# Patient Record
Sex: MALE | Race: WHITE
[De-identification: names, ages, dates, MRNs, and addresses within clinical notes are randomized per-mention and may not be internally consistent; named-entity substitution may affect disease eponyms.]

---

## 2017-02-02 ENCOUNTER — HOSPITAL ENCOUNTER (INPATIENT)
Age: 67
LOS: 4 days | Discharge: HOME | DRG: 190 | End: 2017-02-06
Payer: MEDICARE

## 2017-02-02 DIAGNOSIS — I10: ICD-10-CM

## 2017-02-02 DIAGNOSIS — Z87.891: ICD-10-CM

## 2017-02-02 DIAGNOSIS — Z79.52: ICD-10-CM

## 2017-02-02 DIAGNOSIS — E11.9: ICD-10-CM

## 2017-02-02 DIAGNOSIS — J84.10: ICD-10-CM

## 2017-02-02 DIAGNOSIS — J18.9: ICD-10-CM

## 2017-02-02 DIAGNOSIS — Z80.1: ICD-10-CM

## 2017-02-02 DIAGNOSIS — J44.0: Primary | ICD-10-CM

## 2017-02-02 DIAGNOSIS — J44.1: ICD-10-CM

## 2017-02-02 DIAGNOSIS — J96.01: ICD-10-CM

## 2017-02-02 DIAGNOSIS — Z79.84: ICD-10-CM

## 2017-02-02 DIAGNOSIS — J20.9: ICD-10-CM

## 2017-02-02 DIAGNOSIS — J44.9: ICD-10-CM

## 2017-02-02 DIAGNOSIS — J15.7: ICD-10-CM

## 2019-12-30 ENCOUNTER — HOSPITAL ENCOUNTER (INPATIENT)
Dept: HOSPITAL 76 - ED | Age: 69
LOS: 3 days | Discharge: HOME | DRG: 191 | End: 2020-01-02
Attending: NURSE PRACTITIONER | Admitting: INTERNAL MEDICINE
Payer: MEDICARE

## 2019-12-30 DIAGNOSIS — I25.2: ICD-10-CM

## 2019-12-30 DIAGNOSIS — G89.29: ICD-10-CM

## 2019-12-30 DIAGNOSIS — Z87.01: ICD-10-CM

## 2019-12-30 DIAGNOSIS — Z87.891: ICD-10-CM

## 2019-12-30 DIAGNOSIS — R60.0: ICD-10-CM

## 2019-12-30 DIAGNOSIS — Z79.51: ICD-10-CM

## 2019-12-30 DIAGNOSIS — M19.90: ICD-10-CM

## 2019-12-30 DIAGNOSIS — E87.5: ICD-10-CM

## 2019-12-30 DIAGNOSIS — M54.9: ICD-10-CM

## 2019-12-30 DIAGNOSIS — Z87.820: ICD-10-CM

## 2019-12-30 DIAGNOSIS — Z79.52: ICD-10-CM

## 2019-12-30 DIAGNOSIS — F10.11: ICD-10-CM

## 2019-12-30 DIAGNOSIS — T38.0X5A: ICD-10-CM

## 2019-12-30 DIAGNOSIS — H54.7: ICD-10-CM

## 2019-12-30 DIAGNOSIS — E11.65: ICD-10-CM

## 2019-12-30 DIAGNOSIS — R91.1: ICD-10-CM

## 2019-12-30 DIAGNOSIS — R35.0: ICD-10-CM

## 2019-12-30 DIAGNOSIS — N40.1: ICD-10-CM

## 2019-12-30 DIAGNOSIS — F41.9: ICD-10-CM

## 2019-12-30 DIAGNOSIS — R39.15: ICD-10-CM

## 2019-12-30 DIAGNOSIS — J44.1: Primary | ICD-10-CM

## 2019-12-30 DIAGNOSIS — K21.9: ICD-10-CM

## 2019-12-30 DIAGNOSIS — Z66: ICD-10-CM

## 2019-12-30 DIAGNOSIS — J96.11: ICD-10-CM

## 2019-12-30 DIAGNOSIS — Z90.2: ICD-10-CM
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
## 2019-12-30 DIAGNOSIS — Z51.5: ICD-10-CM

## 2019-12-30 DIAGNOSIS — E87.1: ICD-10-CM

## 2019-12-30 DIAGNOSIS — Z99.81: ICD-10-CM

## 2019-12-30 DIAGNOSIS — Z79.84: ICD-10-CM

## 2019-12-30 DIAGNOSIS — Z79.2: ICD-10-CM

## 2019-12-30 LAB
ALBUMIN DIAFP-MCNC: 4.1 G/DL (ref 3.2–5.5)
ALBUMIN/GLOB SERPL: 1.2 {RATIO} (ref 1–2.2)
ALP SERPL-CCNC: 64 IU/L (ref 42–121)
ALT SERPL W P-5'-P-CCNC: 21 IU/L (ref 10–60)
ANION GAP SERPL CALCULATED.4IONS-SCNC: 10 MMOL/L (ref 6–13)
AST SERPL W P-5'-P-CCNC: 22 IU/L (ref 10–42)
BASOPHILS NFR BLD AUTO: 0 10^3/UL (ref 0–0.1)
BASOPHILS NFR BLD AUTO: 0.3 %
BILIRUB BLD-MCNC: 0.4 MG/DL (ref 0.2–1)
BUN SERPL-MCNC: 21 MG/DL (ref 6–20)
CALCIUM UR-MCNC: 9.4 MG/DL (ref 8.5–10.3)
CHLORIDE SERPL-SCNC: 98 MMOL/L (ref 101–111)
CO2 SERPL-SCNC: 28 MMOL/L (ref 21–32)
CREAT SERPLBLD-SCNC: 1.1 MG/DL (ref 0.6–1.2)
EOSINOPHIL # BLD AUTO: 0 10^3/UL (ref 0–0.7)
EOSINOPHIL NFR BLD AUTO: 0 %
ERYTHROCYTE [DISTWIDTH] IN BLOOD BY AUTOMATED COUNT: 15.4 % (ref 12–15)
GFRSERPLBLD MDRD-ARVRAT: 66 ML/MIN/{1.73_M2} (ref 89–?)
GLOBULIN SER-MCNC: 3.5 G/DL (ref 2.1–4.2)
GLUCOSE SERPL-MCNC: 191 MG/DL (ref 70–100)
HGB UR QL STRIP: 12.1 G/DL (ref 14–18)
LIPASE SERPL-CCNC: 26 U/L (ref 22–51)
LYMPHOCYTES # SPEC AUTO: 0.5 10^3/UL (ref 1.5–3.5)
LYMPHOCYTES NFR BLD AUTO: 4.2 %
MCH RBC QN AUTO: 28.4 PG (ref 27–31)
MCHC RBC AUTO-ENTMCNC: 30.9 G/DL (ref 32–36)
MCV RBC AUTO: 92 FL (ref 80–94)
MONOCYTES # BLD AUTO: 0.5 10^3/UL (ref 0–1)
MONOCYTES NFR BLD AUTO: 5 %
NEUTROPHILS # BLD AUTO: 9.7 10^3/UL (ref 1.5–6.6)
NEUTROPHILS # SNV AUTO: 10.8 X10^3/UL (ref 4.8–10.8)
NEUTROPHILS NFR BLD AUTO: 89.8 %
PDW BLD AUTO: 8.3 FL (ref 7.4–11.4)
PLATELET # BLD: 341 10^3/UL (ref 130–450)
PROT SPEC-MCNC: 7.6 G/DL (ref 6.7–8.2)
RBC MAR: 4.26 10^6/UL (ref 4.7–6.1)
SODIUM SERPLBLD-SCNC: 136 MMOL/L (ref 135–145)

## 2019-12-30 PROCEDURE — 96376 TX/PRO/DX INJ SAME DRUG ADON: CPT

## 2019-12-30 PROCEDURE — 99285 EMERGENCY DEPT VISIT HI MDM: CPT

## 2019-12-30 PROCEDURE — 94640 AIRWAY INHALATION TREATMENT: CPT

## 2019-12-30 PROCEDURE — 96361 HYDRATE IV INFUSION ADD-ON: CPT

## 2019-12-30 PROCEDURE — 71045 X-RAY EXAM CHEST 1 VIEW: CPT

## 2019-12-30 PROCEDURE — 84484 ASSAY OF TROPONIN QUANT: CPT

## 2019-12-30 PROCEDURE — 87275 INFLUENZA B AG IF: CPT

## 2019-12-30 PROCEDURE — 84132 ASSAY OF SERUM POTASSIUM: CPT

## 2019-12-30 PROCEDURE — 93005 ELECTROCARDIOGRAM TRACING: CPT

## 2019-12-30 PROCEDURE — 83735 ASSAY OF MAGNESIUM: CPT

## 2019-12-30 PROCEDURE — 87276 INFLUENZA A AG IF: CPT

## 2019-12-30 PROCEDURE — 80053 COMPREHEN METABOLIC PANEL: CPT

## 2019-12-30 PROCEDURE — 83880 ASSAY OF NATRIURETIC PEPTIDE: CPT

## 2019-12-30 PROCEDURE — 85025 COMPLETE CBC W/AUTO DIFF WBC: CPT

## 2019-12-30 PROCEDURE — 84100 ASSAY OF PHOSPHORUS: CPT

## 2019-12-30 PROCEDURE — 83690 ASSAY OF LIPASE: CPT

## 2019-12-30 PROCEDURE — 80048 BASIC METABOLIC PNL TOTAL CA: CPT

## 2019-12-30 PROCEDURE — 83036 HEMOGLOBIN GLYCOSYLATED A1C: CPT

## 2019-12-30 PROCEDURE — 99223 1ST HOSP IP/OBS HIGH 75: CPT

## 2019-12-30 PROCEDURE — 96374 THER/PROPH/DIAG INJ IV PUSH: CPT

## 2019-12-30 PROCEDURE — 36415 COLL VENOUS BLD VENIPUNCTURE: CPT

## 2019-12-30 PROCEDURE — 96372 THER/PROPH/DIAG INJ SC/IM: CPT

## 2019-12-30 RX ADMIN — INSULIN ASPART SCH UNIT: 100 INJECTION, SOLUTION INTRAVENOUS; SUBCUTANEOUS at 21:17

## 2019-12-30 RX ADMIN — IPRATROPIUM BROMIDE AND ALBUTEROL SULFATE SCH ML: 2.5; .5 SOLUTION RESPIRATORY (INHALATION) at 23:21

## 2019-12-30 RX ADMIN — INSULIN GLARGINE SCH UNIT: 100 INJECTION, SOLUTION SUBCUTANEOUS at 21:16

## 2019-12-30 RX ADMIN — ACETAMINOPHEN AND CODEINE PHOSPHATE PRN TAB: 300; 30 TABLET ORAL at 22:09

## 2019-12-30 NOTE — ED PHYSICIAN DOCUMENTATION
PD HPI DYSPNEA





- Stated complaint


Stated Complaint: SOA





- Chief complaint


Chief Complaint: Resp





- History obtained from


History obtained from: Patient





- History of Present Illness


Timing - details: Gradual onset


Pain level max: 0


Pain level now: 0


Inciting event(s): URI


Improved by: O2, Rest


Worsened by: Exertion


Associated symptoms: Fever, Cough


Recently seen: Not recently seen





- Additional information


Additional information: 





69-year-old male with a history of COPD, sees a pulmonologist in [place].  He 

states he has been sick for the past 4 to 5 days.  Is maintained on azithromycin

daily.  Started Bactrim 4 days ago.  Concerned that he may have pneumonia.  He 

is normally on 2 L of home O2 24/7.  He has had to increase his home O2 to 3 L. 

He states he feels more out of breath.  Has had chills, unsure if he has had 

fever.  Has a dry cough.  He is currently on prednisone 40 mg a day as well.





Review of Systems


Ten Systems: 10 systems reviewed and negative


Constitutional: reports: Chills


Respiratory: reports: Cough


GI: denies: Vomiting


Skin: denies: Rash


Musculoskeletal: denies: Neck pain, Back pain


Neurologic: denies: Focal weakness, Numbness, Confused





PD PAST MEDICAL HISTORY





- Past Medical History


Past Medical History: Yes


Cardiovascular: MI


Respiratory: COPD


Endocrine/Autoimmune: Type 2 diabetes


GI: GERD


GYN: None


: None


HEENT: None


Psych: None


Musculoskeletal: None


Derm: None





- Past Surgical History


Past Surgical History: Yes


General: Other (thoracotomy of left lobe with wedge)





- Present Medications


Home Medications: 


                                Ambulatory Orders











 Medication  Instructions  Recorded  Confirmed


 


Acetaminophen/Cod 300/30 [Tylenol 1 - 2 tab PO Q6H PRN 02/02/17 02/02/17





#3]   


 


Albuterol Sulfate [Proair Hfa 2 puffs INH Q4H PRN 02/02/17 02/02/17





Inhaler]   


 


Diazepam 5 mg PO QPM 02/02/17 02/02/17


 


Etodolac [Lodine] 400 mg PO DAILY 02/02/17 02/02/17


 


Fluticasone 220 Mcg [Flovent] 2 puffs INH BID 02/02/17 02/02/17


 


Ipratropium/Albuterol [Combivent 1 puffs INH QID 02/02/17 02/02/17





Respimat]   


 


Salmeterol Xinafoate [Serevent 1 puffs INH BID 02/02/17 02/02/17





Diskus]   


 


Tiotropium Bromide [Spiriva] 1 puffs INH DAILY 02/02/17 02/02/17


 


lisinopriL [Lisinopril] 10 mg PO DAILY 02/02/17 02/02/17


 


metFORMIN [Glucophage] 1,000 mg PO DAILY PM 02/02/17 02/02/17


 


metFORMIN [Glucophage] 1,500 mg PO QDBREAKFAST 02/02/17 02/02/17


 


Benzonatate [Tessalon] 100 mg PO TID #30 capsule 02/06/17 


 


Budesonide [Pulmicort] 0.5 mg INH RTBID  neb 02/06/17 


 


Famotidine [Pepcid] 20 mg PO DAILY  tablet 02/06/17 


 


Formoterol Fumarate [Perforomist] 20 mcg INH RTBID  neb 02/06/17 


 


Insulin Aspart [NovoLOG] 1 - 5 unit SUBQ 02/06/17 





 0800,1200,1700,2100  pen  


 


diazePAM [Valium] 5 mg PO QPM #30 tablet 02/06/17 


 


guaiFENesin/CODEINE [Robitussin AC] 5 ml PO Q6HR PRN 7 Days  udc 02/06/17 


 


levoFLOXacin [Levaquin] 750 mg PO DAILY #5 tablet 02/06/17 


 


predniSONE [Deltasone] 20 mg PO DAILYWM #10 tablet 02/06/17 














- Allergies


Allergies/Adverse Reactions: 


                                    Allergies











Allergy/AdvReac Type Severity Reaction Status Date / Time


 


ciprofloxacin [From Cipro] Allergy  Unknown Verified 12/30/19 14:21


 


ciprofloxacin HCl * Allergy  Unknown Verified 12/30/19 14:21





[From Cipro]     


 


hydromorphone HCl * Allergy  Unknown Verified 12/30/19 14:21





[From Dilaudid]     


 


oxycodone Allergy  Unknown Verified 12/30/19 14:21


 


oxycodone HCl * Allergy  Unknown Verified 12/30/19 14:21





[From OxyContin]     














- Social History


Does the pt smoke?: No


Smoking Status: Former smoker





- POLST


Patient has POLST: No


POLST Status: Full Code





PD ED PE NORMAL





- Vitals


Vital signs reviewed: Yes





- General


General: Alert and oriented X 3, No acute distress





- HEENT


HEENT: PERRL, Ears normal, Moist mucous membranes, Pharynx benign





- Neck


Neck: Supple, no meningeal sign





- Cardiac


Cardiac: RRR





- Respiratory


Respiratory: Other (Moderate respiratory distress with very diminished breath 

sounds and wheezing bilaterally)





- Abdomen


Abdomen: Soft, Non tender, Non distended





- Derm


Derm: Warm and dry, No rash





- Extremities


Extremities: No edema





- Neuro


Neuro: Alert and oriented X 3





Results





- Vitals


Vitals: 


                               Vital Signs - 24 hr











  12/30/19 12/30/19 12/30/19





  14:18 16:04 16:24


 


Temperature 37.1 C  


 


Heart Rate 93 90 89


 


Respiratory 20 18 20





Rate   


 


Blood Pressure 131/71 H 115/77 


 


O2 Saturation 94 98 














  12/30/19 12/30/19 12/30/19





  16:47 17:04 19:00


 


Temperature   


 


Heart Rate 91 91 103 H


 


Respiratory 18 18 18





Rate   


 


Blood Pressure  109/79 134/75 H


 


O2 Saturation  100 97








                                     Oxygen











O2 Source                      Room air


 


Oxygen Flow Rate               4

















- Labs


Labs: 


                                Laboratory Tests











  12/30/19 12/30/19 12/30/19





  15:29 15:29 15:29


 


WBC  10.8  


 


RBC  4.26 L  


 


Hgb  12.1 L  


 


Hct  39.2 L  


 


MCV  92.0  


 


MCH  28.4  


 


MCHC  30.9 L  


 


RDW  15.4 H  


 


Plt Count  341  


 


MPV  8.3  


 


Neut # (Auto)  9.7 H  


 


Lymph # (Auto)  0.5 L  


 


Mono # (Auto)  0.5  


 


Eos # (Auto)  0.0  


 


Baso # (Auto)  0.0  


 


Absolute Nucleated RBC  0.00  


 


Nucleated RBC %  0.0  


 


Sodium   136 


 


Potassium   5.6 H 


 


Chloride   98 L 


 


Carbon Dioxide   28 


 


Anion Gap   10.0 


 


BUN   21 H 


 


Creatinine   1.1 


 


Estimated GFR (MDRD)   66 L 


 


Glucose   191 H 


 


Calcium   9.4 


 


Total Bilirubin   0.4 


 


AST   22 


 


ALT   21 


 


Alkaline Phosphatase   64 


 


Troponin I High Sens    6.8


 


B-Natriuretic Peptide   


 


Total Protein   7.6 


 


Albumin   4.1 


 


Globulin   3.5 


 


Albumin/Globulin Ratio   1.2 


 


Lipase   26 


 


Influenza A (Rapid)   


 


Influenza B (Rapid)   














  12/30/19 12/30/19





  15:29 16:00


 


WBC  


 


RBC  


 


Hgb  


 


Hct  


 


MCV  


 


MCH  


 


MCHC  


 


RDW  


 


Plt Count  


 


MPV  


 


Neut # (Auto)  


 


Lymph # (Auto)  


 


Mono # (Auto)  


 


Eos # (Auto)  


 


Baso # (Auto)  


 


Absolute Nucleated RBC  


 


Nucleated RBC %  


 


Sodium  


 


Potassium  


 


Chloride  


 


Carbon Dioxide  


 


Anion Gap  


 


BUN  


 


Creatinine  


 


Estimated GFR (MDRD)  


 


Glucose  


 


Calcium  


 


Total Bilirubin  


 


AST  


 


ALT  


 


Alkaline Phosphatase  


 


Troponin I High Sens  


 


B-Natriuretic Peptide  34 


 


Total Protein  


 


Albumin  


 


Globulin  


 


Albumin/Globulin Ratio  


 


Lipase  


 


Influenza A (Rapid)   Negative


 


Influenza B (Rapid)   Negative














- Rads (name of study)


  ** Chest x-ray


Radiology: Prelim report reviewed, EMP read contemporaneously, See rad report 

(Hyperinflated lungs compatible with COPD.  Stellate lateral right upper lobe 

opacity.  Question scarring or lung nodule.  Recommend chest CT.  Lateral right 

basilar atelectasis or airspace disease)





PD MEDICAL DECISION MAKING





- ED course


Complexity details: reviewed results, re-evaluated patient, considered 

differential, d/w patient


ED course: 





Patient with a COPD exacerbation.  He is on 4 inhalers at home, but no 

nebulizers.  He is also on 40 mg of prednisone daily as well as azithromycin 

chronically.  He has been taking Bactrim as well.  Added doxycycline here.  

Given 3 nebulizer treatments, O2 sat improved, but he is still requiring 3 to 4 

L via nasal cannula, his baseline is 2 L.  Whenever he gets up to walk he drops 

to the mid 80s.  Discussed the case with Dr. Byrnes, hospitalist who accepts 





This document was made in part using voice recognition software. While efforts 

are made to proofread this document, sound alike and grammatical errors may 

occur.





Patient also has mild hyperkalemia, this should improve with the albuterol 

administration.





Departure





- Departure


Disposition: ED Place in Observation


Clinical Impression: 


 COPD exacerbation, Hyperkalemia





Condition: Stable


Discharge Date/Time: 12/30/19 20:30

## 2019-12-30 NOTE — HISTORY & PHYSICAL EXAMINATION
Chief Complaint





- Chief Complaint


Chief Complaint: Dyspnea





History of Present Illness





- Admitted From


Admitted From:: Home





- History Obtained From


Records Reviewed: Yes


History obtained from: Patient, ER Physician, EMR





- History of Present Illness


HPI Comment/Other: 


This is a 69-year-old male with a past medical history significant for COPD on 2

L of oxygen, type 2 diabetes who presents today complaining of worsening 

shortness of breath over the past 5 days.  States he first developed flulike 

symptoms about 1 week ago.  He had chills, malaise, body aches.  He then started

to feel short of breath about 2 days later.  He is on chronic prednisone 20 mg 

daily and he increase this to 60 mg with some improvement in his symptoms.  He 

also began taking Bactrim orally that he had available at the pharmacy.  He said

he felt better on Saturday but that his dyspnea returned and has progressively 

become more severe and so he sought medical attention today.  He has been on 

chronic steroids for many years.  He has also been on daily azithromycin pres

cribed by his pulmonologist for about the past 8 months.  He follows with Dr. Shon Perez at the [facility].  He no longer smokes but did smoke about a 

pack and 1/2 to 3 packs a day for around 30 years.  He wears 2 L of oxygen at 

baseline and uses 3 L with exertion.  This is his second hospitalization for 

COPD exacerbation with his last being about 2 years ago.  He reports feeling 

dyspneic at rest but it is more prominent with exertion.  He is also had a 

productive cough with beige sputum.  He does report left-sided chest pain that 

is pleuritic in nature and is most profound with a deep inspiration.  The pain 

is pressure-like in nature.  He reports no prior cardiac history.  He states he 

had a stress test about 2 years ago which was unremarkable.  He is also 

complaining of lower extremity edema but he states this is chronic for him.  It 

becomes more profound when he consumes more salt in his diet.  He denies 

orthopnea.  He reports no nausea, vomiting, abdominal pain, diarrhea.





In the emergency department, he is found to be afebrile with temperature of the 

37.1.  He was tachycardic with a heart rate of 93.  Blood pressure was 131/71.  

He was slightly tachypneic with a respiratory of 20 and he was saturating 98% on

4 L of oxygen.  Labs were unremarkable except for potassium of 5.6.  Influenza 

was negative.  A chest x-ray did not reveal an obvious infiltrate.  He was 

treated with Decadron p.o. as well as a few breathing treatments with only mild 

improvement.  He still felt quite dyspneic with exertion and his oxygen 

durations dropped to the 80s and therefore medicine was consulted for admission.





History





- Past Medical History


Respiratory: reports: COPD, Emphysema, Pneumonia


Endocrine/Autoimmune: reports: Type 2 diabetes


GI: reports: GERD


GYN: reports: None


: reports: None


HEENT: reports: None


Psych: reports: None


Musculoskeletal: reports: None


Derm: reports: None


MRSA Hx?: No





- Past Surgical History


General: reports: Other (thoracotomy of left lobe with wedge)





- Family & Social History


Family History Comment/Other: Reports no known family history.  He denies family

history of COPD or cardiac problems.


Living arrangement: At home


Living Situation: Alone


Social History Notes: He lives at home alone.  He was previously employed for 

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  He reports 3 exposures in

the past to chlorine.  He no longer smokes but did smoke a pack and 1/2 to 3 

packs a day for 32 years.  He does not drink alcohol.





- Substance History


Use: Uses substance without health or social issues: NONE





- POLST


Patient has POLST: No


POLST Status: Full Code





Meds/Allgy





- Home Medications


Home Medications: 


                                Ambulatory Orders











 Medication  Instructions  Recorded  Confirmed


 


Acetaminophen/Cod 300/30 [Tylenol 1 - 2 tab PO Q6H PRN 02/02/17 02/02/17





#3]   


 


Albuterol Sulfate [Proair Hfa 2 puffs INH Q4H PRN 02/02/17 02/02/17





Inhaler]   


 


Diazepam 5 mg PO QPM 02/02/17 02/02/17


 


Etodolac [Lodine] 400 mg PO DAILY 02/02/17 02/02/17


 


Fluticasone 220 Mcg [Flovent] 2 puffs INH BID 02/02/17 02/02/17


 


Ipratropium/Albuterol [Combivent 1 puffs INH QID 02/02/17 02/02/17





Respimat]   


 


Salmeterol Xinafoate [Serevent 1 puffs INH BID 02/02/17 02/02/17





Diskus]   


 


Tiotropium Bromide [Spiriva] 1 puffs INH DAILY 02/02/17 02/02/17


 


lisinopriL [Lisinopril] 10 mg PO DAILY 02/02/17 02/02/17


 


metFORMIN [Glucophage] 1,000 mg PO DAILY PM 02/02/17 02/02/17


 


metFORMIN [Glucophage] 1,500 mg PO QDBREAKFAST 02/02/17 02/02/17


 


Benzonatate [Tessalon] 100 mg PO TID #30 capsule 02/06/17 


 


Budesonide [Pulmicort] 0.5 mg INH RTBID  neb 02/06/17 


 


Famotidine [Pepcid] 20 mg PO DAILY  tablet 02/06/17 


 


Formoterol Fumarate [Perforomist] 20 mcg INH RTBID  neb 02/06/17 


 


Insulin Aspart [NovoLOG] 1 - 5 unit SUBQ 02/06/17 





 0800,1200,1700,2100  pen  


 


diazePAM [Valium] 5 mg PO QPM #30 tablet 02/06/17 


 


guaiFENesin/CODEINE [Robitussin AC] 5 ml PO Q6HR PRN 7 Days  udc 02/06/17 


 


levoFLOXacin [Levaquin] 750 mg PO DAILY #5 tablet 02/06/17 


 


predniSONE [Deltasone] 20 mg PO DAILYWM #10 tablet 02/06/17 














- Allergies


Allergies/Adverse Reactions: 


                                    Allergies











Allergy/AdvReac Type Severity Reaction Status Date / Time


 


ciprofloxacin [From Cipro] Allergy  Unknown Verified 12/30/19 14:21


 


ciprofloxacin HCl * Allergy  Unknown Verified 12/30/19 14:21





[From Cipro]     


 


hydromorphone HCl * Allergy  Unknown Verified 12/30/19 14:21





[From Dilaudid]     


 


oxycodone Allergy  Unknown Verified 12/30/19 14:21


 


oxycodone HCl * Allergy  Unknown Verified 12/30/19 14:21





[From OxyContin]     














Review of Systems





- Constitutional


Constitutional: reports: Chills, Malaise.  denies: Fatigue, Fever





- Ears, Nose & Throat


Ears, Nose & Throat: reports: Nasal congestion, Sore throat





- Cardiovascular


Cariovascular: reports: Chest pain (Pleuritic and present with deep 

inspiration.), Edema, Exertional dyspnea, Decr. exercise tolerance.  denies: 

Lightheadedness





- Respiratory


Respiratory: reports: Cough, Sputum production, Wheezing, SOB at rest, SOB with 

exertion





- Gastrointestinal


Gastrointestinal: denies: Abdominal pain, Diarrhea, Nausea, Vomiting





- Genitourinary


Genitourinary: denies: Dysuria





- Musculoskeletal


Musculoskeletal: reports: Muscle pain, Back pain





- Integumentary


Integumentary: denies: Rash





- Neurological


Neurological: denies: General weakness, Focal weakness





- All Other Systems


All Other Systems: reports: Reviewed and negative


Prior Level of Functionality: 


He lives alone and is independent with his ADLs.








Exam





- Vital Signs


Reviewed Vital Signs: Yes


Vital Signs: 





                                Vital Signs x48h











  Temp Pulse Resp BP Pulse Ox


 


 12/30/19 19:00   103 H  18  134/75 H  97


 


 12/30/19 17:04   91  18  109/79  100


 


 12/30/19 16:47   91  18  


 


 12/30/19 16:24   89  20  


 


 12/30/19 16:04   90  18  115/77  98


 


 12/30/19 14:18  37.1 C  93  20  131/71 H  94














- Physical Exam


General Appearance: positive: No acute distress, Alert


Eyes Bilateral: positive: Normal inspection


ENT: positive: ENT inspection nml, Other (Nasal cannula in place)


Neck: positive: Nml inspection


Respiratory: positive: No respiratory distress, Wheezes (He has faint expiratory

wheezes.)


Cardiovascular: positive: Regular rate & rhythm, No murmur, Tachycardia.  

negative: Systolic murmur, Diastolic murmur


Abdomen: positive: Non-tender, No distention.  negative: Tenderness, Guarding, 

Rebound


Skin: positive: No rash, Warm, Dry


Extremities: positive: Full ROM, Pedal edema (+2 pitting edema in bilateral 

lower extremities.)


Neurologic/Psychiatric: positive: Oriented x3.  negative: Disoriented to person,

Disoriented to place, Disoriented to time, Weakness, Slurred/abnml speech





Conclusion/Plan





- Problem List


(1) COPD exacerbation


Conclusion/Plan: 


His presentation is consistent with a COPD exacerbation.  He is still 

symptomatic despite treatment in the emergency department and therefore will be 

placed in observation.  We will start him on Solu-Medrol 40 mg twice a day.  We 

will also start him on Levaquin as of 750 mg orally and duo nebs 

around-the-clock.  Continue supplemental oxygen for goal saturation greater than

88%.








(2) Hyperkalemia


Conclusion/Plan: 


His potassium is elevated at 5.6.  The etiology is not clear as he does not have

acute kidney injury although he is on lisinopril and is a diabetic with mild 

hyperglycemia.  Will recheck a basic metabolic panel and hold lisinopril for 

time being.








(3) Chronic respiratory failure with hypoxia


Conclusion/Plan: 


He has chronic respiratory failure with hypoxia secondary to his COPD.  He is on

2 L of oxygen at baseline.  He is currently saturating high 90s on 2 L of oxygen

but reportedly desaturated to the mid 80s with exertion.  Will treat his 

underlying COPD exacerbation and will likely perform another exercise 

desaturation test prior to discharge. Goal oxygen saturation will be greater 

than 88%.  He is agreeable to seeing palliative care on outpatient basis given 

the severity of his COPD.








(4) Lower extremity edema


Conclusion/Plan: 


Ports is chronic and has been waxing and waning depending on his salt intake.  

Reports no prior history of heart failure and denies orthopnea.  Will check a 

BNP for the time being.  Unfortunately echocardiogram is not available for next 

few days but he may require an outpatient echocardiogram.  Will limit his salt 

intake and ask him to elevated his legs. Will consider compression stockings if 

patient is agreeable.








(5) Diabetes mellitus type II, non insulin dependent


Conclusion/Plan: 


He has type 2 diabetes and is on chronic prednisone for his COPD.  His last A1c 

was 7.1%.  He is only on metformin at home.  We will place him on a carb 

controlled diet and start him on Lantus 5 units at night along with sliding 

scale.  We will recheck another hemoglobin A1c.








(6) Right upper lobe pulmonary nodule


Conclusion/Plan: 


Concern for right upper lobe lung nodule on the chest x-ray.  The patient has 

been getting outpatient CT scans of the chest every 3 months and is scheduled 

for 1 in 2 weeks.  I did inform him that chest x-ray was concerning for possible

nodule.  He states he will follow-up with his pulmonologist.








- Lab Results


Lab results reviewed: Yes


Fish Bones: 


                                 12/31/19 05:23





                                 12/31/19 05:23





- Diagnostic Imaging Results


Diagnostic Imaging Results: positive: Final report reviewed





Core Measures





- Anticipated LOS


I expect patient to be DC'd or transferred within 96 hours.: Yes





- Issues


Hospital Issues and Management Plan: 


69-year-old male with COPD on chronic oxygen admitted for COPD exacerbation.  

Will treat with antibiotics, steroids, breathing treatments.








- DVT/VTE - Prophylaxis


VTE/DVT Device ordered at admit?: Yes


VTE/DVT Prophylaxis med ordered at admit?: Yes

## 2019-12-30 NOTE — XRAY REPORT
Reason:  dyspnea

Procedure Date:  12/30/2019   

Accession Number:  [id number] / [id number]                    

Procedure:  XR  - Chest 1 View X-Ray CPT Code:  54041

 

***Final Report***

 

 

FULL RESULT:

 

 

EXAM:

CHEST RADIOGRAPHY

 

EXAM DATE: 12/30/2019 04:00 PM.

 

CLINICAL HISTORY: Dyspnea.

 

COMPARISON: CHEST 2 VIEW PA/LAT 02/02/2017 10:29 AM.

 

TECHNIQUE: 1 view.

 

FINDINGS:

Lungs/Pleura: Hyperinflated lungs.

 

Prior extensive left upper lobe airspace disease has resolved.

 

Elongated lateral right basilar opacity.

 

Stellate lateral right upper lobe opacity projecting near posterolateral 

right fifth rib.

 

Mediastinum: Within exam limitations, the cardiomediastinal contour is 

normal.

 

Other: None.

 

IMPRESSION:

1. Hyperinflated lungs compatible with COPD.

2. Stellate lateral right upper lobe opacity. Question scarring or lung 

nodule. Recommend chest CT.

3. Lateral right basilar atelectasis or airspace disease.

 

RADIA

## 2019-12-31 LAB
ANION GAP SERPL CALCULATED.4IONS-SCNC: 10 MMOL/L (ref 6–13)
BASOPHILS NFR BLD AUTO: 0 10^3/UL (ref 0–0.1)
BASOPHILS NFR BLD AUTO: 0.2 %
BUN SERPL-MCNC: 21 MG/DL (ref 6–20)
CALCIUM UR-MCNC: 8.7 MG/DL (ref 8.5–10.3)
CHLORIDE SERPL-SCNC: 96 MMOL/L (ref 101–111)
CO2 SERPL-SCNC: 27 MMOL/L (ref 21–32)
CREAT SERPLBLD-SCNC: 1 MG/DL (ref 0.6–1.2)
EOSINOPHIL # BLD AUTO: 0 10^3/UL (ref 0–0.7)
EOSINOPHIL NFR BLD AUTO: 0 %
ERYTHROCYTE [DISTWIDTH] IN BLOOD BY AUTOMATED COUNT: 15.2 % (ref 12–15)
EST. AVERAGE GLUCOSE BLD GHB EST-MCNC: 186 MG/DL (ref 70–100)
GFRSERPLBLD MDRD-ARVRAT: 74 ML/MIN/{1.73_M2} (ref 89–?)
GLUCOSE SERPL-MCNC: 207 MG/DL (ref 70–100)
HB2 TOTAL: 11.5 G/DL
HBA1C BLD-MCNC: 0.75 G/DL
HEMOGLOBIN A1C %: 8.1 % (ref 4.6–6.2)
HGB UR QL STRIP: 11.5 G/DL (ref 14–18)
LYMPHOCYTES # SPEC AUTO: 0.5 10^3/UL (ref 1.5–3.5)
LYMPHOCYTES NFR BLD AUTO: 3.6 %
MAGNESIUM SERPL-MCNC: 2.4 MG/DL (ref 1.7–2.8)
MCH RBC QN AUTO: 28.3 PG (ref 27–31)
MCHC RBC AUTO-ENTMCNC: 30.5 G/DL (ref 32–36)
MCV RBC AUTO: 92.6 FL (ref 80–94)
MONOCYTES # BLD AUTO: 0.7 10^3/UL (ref 0–1)
MONOCYTES NFR BLD AUTO: 4.9 %
NEUTROPHILS # BLD AUTO: 12.8 10^3/UL (ref 1.5–6.6)
NEUTROPHILS # SNV AUTO: 14.1 X10^3/UL (ref 4.8–10.8)
NEUTROPHILS NFR BLD AUTO: 90.5 %
PDW BLD AUTO: 8.6 FL (ref 7.4–11.4)
PHOSPHATE BLD-MCNC: 4.4 MG/DL (ref 2.5–4.6)
PLATELET # BLD: 323 10^3/UL (ref 130–450)
RBC MAR: 4.07 10^6/UL (ref 4.7–6.1)
SODIUM SERPLBLD-SCNC: 133 MMOL/L (ref 135–145)

## 2019-12-31 RX ADMIN — ACETAMINOPHEN AND CODEINE PHOSPHATE PRN TAB: 300; 30 TABLET ORAL at 03:53

## 2019-12-31 RX ADMIN — IPRATROPIUM BROMIDE AND ALBUTEROL SULFATE SCH ML: 2.5; .5 SOLUTION RESPIRATORY (INHALATION) at 21:26

## 2019-12-31 RX ADMIN — IPRATROPIUM BROMIDE AND ALBUTEROL SULFATE SCH: 2.5; .5 SOLUTION RESPIRATORY (INHALATION) at 01:00

## 2019-12-31 RX ADMIN — SODIUM CHLORIDE, PRESERVATIVE FREE PRN ML: 5 INJECTION INTRAVENOUS at 14:55

## 2019-12-31 RX ADMIN — SODIUM CHLORIDE, PRESERVATIVE FREE SCH ML: 5 INJECTION INTRAVENOUS at 00:42

## 2019-12-31 RX ADMIN — INSULIN GLARGINE SCH UNIT: 100 INJECTION, SOLUTION SUBCUTANEOUS at 22:11

## 2019-12-31 RX ADMIN — BUDESONIDE SCH MG: 0.5 SUSPENSION RESPIRATORY (INHALATION) at 21:26

## 2019-12-31 RX ADMIN — IPRATROPIUM BROMIDE AND ALBUTEROL SULFATE SCH ML: 2.5; .5 SOLUTION RESPIRATORY (INHALATION) at 11:38

## 2019-12-31 RX ADMIN — INSULIN ASPART SCH UNIT: 100 INJECTION, SOLUTION INTRAVENOUS; SUBCUTANEOUS at 11:22

## 2019-12-31 RX ADMIN — SODIUM CHLORIDE SCH MLS/HR: 9 INJECTION, SOLUTION INTRAVENOUS at 14:54

## 2019-12-31 RX ADMIN — BENZONATATE PRN MG: 100 CAPSULE ORAL at 09:09

## 2019-12-31 RX ADMIN — ENOXAPARIN SODIUM SCH MG: 100 INJECTION SUBCUTANEOUS at 09:08

## 2019-12-31 RX ADMIN — FORMOTEROL FUMARATE DIHYDRATE SCH MCG: 20 SOLUTION RESPIRATORY (INHALATION) at 21:26

## 2019-12-31 RX ADMIN — ACETAMINOPHEN AND CODEINE PHOSPHATE PRN TAB: 300; 30 TABLET ORAL at 13:30

## 2019-12-31 RX ADMIN — METHYLPREDNISOLONE SODIUM SUCCINATE SCH MG: 40 INJECTION, POWDER, FOR SOLUTION INTRAMUSCULAR; INTRAVENOUS at 09:14

## 2019-12-31 RX ADMIN — INSULIN ASPART SCH UNIT: 100 INJECTION, SOLUTION INTRAVENOUS; SUBCUTANEOUS at 17:24

## 2019-12-31 RX ADMIN — SODIUM CHLORIDE, PRESERVATIVE FREE SCH ML: 5 INJECTION INTRAVENOUS at 09:08

## 2019-12-31 RX ADMIN — METHYLPREDNISOLONE SODIUM SUCCINATE SCH MG: 40 INJECTION, POWDER, FOR SOLUTION INTRAMUSCULAR; INTRAVENOUS at 22:13

## 2019-12-31 RX ADMIN — METHYLPREDNISOLONE SODIUM SUCCINATE SCH MG: 40 INJECTION, POWDER, FOR SOLUTION INTRAMUSCULAR; INTRAVENOUS at 17:23

## 2019-12-31 RX ADMIN — INSULIN ASPART SCH UNIT: 100 INJECTION, SOLUTION INTRAVENOUS; SUBCUTANEOUS at 09:07

## 2019-12-31 RX ADMIN — BENZONATATE PRN MG: 100 CAPSULE ORAL at 01:50

## 2019-12-31 RX ADMIN — IPRATROPIUM BROMIDE AND ALBUTEROL SULFATE SCH ML: 2.5; .5 SOLUTION RESPIRATORY (INHALATION) at 04:13

## 2019-12-31 RX ADMIN — SODIUM CHLORIDE, PRESERVATIVE FREE SCH ML: 5 INJECTION INTRAVENOUS at 17:23

## 2019-12-31 RX ADMIN — INSULIN ASPART SCH UNIT: 100 INJECTION, SOLUTION INTRAVENOUS; SUBCUTANEOUS at 22:12

## 2019-12-31 RX ADMIN — IPRATROPIUM BROMIDE AND ALBUTEROL SULFATE SCH ML: 2.5; .5 SOLUTION RESPIRATORY (INHALATION) at 08:02

## 2019-12-31 RX ADMIN — IPRATROPIUM BROMIDE AND ALBUTEROL SULFATE SCH ML: 2.5; .5 SOLUTION RESPIRATORY (INHALATION) at 14:59

## 2019-12-31 NOTE — PHARMACY PROGRESS NOTE
- Best Possible Medication History


Admit Date and Time: 12/31/19 3606


Processed by: Pharmacy


Medication History completed: Yes


Patient Interview: Pt interview ONLY source





As the person ultimately responsible for medication therapy, providers are able 

to order a medication from an existing home medication list in [facility] via the 

"Reconcile Routine" prior to Confirmation of that medication by support staff. 

Such practice is discouraged except when the physician, in their clinical 

judgment, deems that a medical need exists for a medication without regard to 

previous use.

## 2019-12-31 NOTE — PROVIDER PROGRESS NOTE
Assessment/Plan





- Problem List


(1) COPD exacerbation


Assessment/Plan: 





pt still report SOB, increase of steroid dosage to 60 mg tid from 40 mg bid. pt 

took 60 mg Prednisone at home and failed


resume home INH steroid, long acting beta-agonist, and albuterol


continue supplement of O2





(2) Hyperkalemia


Conclusion/Plan: 


continue increase, unknown etiology, pt is on insulin and beta-agonist. order 

Kaylaxine


continue lab monitor





(3) Chronic respiratory failure with hypoxia


Conclusion/Plan: 


pt present SOB, increase of steroid dosage to 60 mg tid from 40 mg bid. pt took 

60 mg Prednisone at home and failed


resume home INH steroid, long acting beta-agonist, and albuterol


continue supplement of O2





(4) Lower extremity edema


Conclusion/Plan: 


stable. pt has no issue to walk. advise rise his legs and Will consider 

compression stockings if patient is agreeable.








(5) Diabetes mellitus type II, non insulin dependent


Conclusion/Plan: 


A1C 8.1, continue slide scale, hypoglycemia protocol





(6) Right upper lobe pulmonary nodule


Conclusion/Plan: 


advise pt followup his pulmonologist to monitor his nodule as his schedule. 

Concern for right upper lobe lung nodule on the chest x-ray.  The patient has 

been getting outpatient CT scans of the chest every 3 months and is scheduled 

for 1 in 2 weeks.  I did inform him that chest x-ray was concerning for possible

nodule.  He states he will follow-up with his pulmonologist.











- Current Meds


Current Meds: 





                               Current Medications











Generic Name Dose Route Start Last Admin





  Trade Name Freq  PRN Reason Stop Dose Admin


 


Acetaminophen/Codeine Phosphate  1 tab  12/30/19 21:29  12/31/19 13:30





  Tylenol #3  PO   1 tab





  Q6HR PRN   Administration





  PAIN   





     





     





     


 


Albuterol/Ipratropium  3 ml  12/31/19 11:00  12/31/19 14:59





  Duoneb  INH   3 ml





  RTQ4H DOMINIQUE   Administration





     





     





     





     


 


Benzonatate  100 mg  12/30/19 19:52  12/31/19 09:09





  Tessalon  PO   100 mg





  TID PRN   Administration





  Cough   





     





     





     


 


Enoxaparin Sodium  40 mg  12/31/19 09:00  12/31/19 09:08





  Lovenox  SUBQ   40 mg





  DAILY DOMINIQUE   Administration





     





     





     





     


 


Sodium Chloride  1,000 mls @ 100 mls/hr  12/31/19 14:00  12/31/19 14:54





  Normal Saline 0.9%  IV  01/01/20 09:59  100 mls/hr





  .Q10H DOMINIQUE   Administration





     





     





     





     


 


Insulin Aspart  1 - 9 unit  12/30/19 21:00  12/31/19 11:22





  Novolog  SUBQ   1 unit





  0800,1200,1700,2100 DOMINIQUE   Administration





     





     





  Protocol   





     


 


Insulin Glargine  5 unit  12/30/19 21:00  12/30/19 21:16





  Lantus Solostar  SUBQ   5 unit





  QPM DOMINIQUE   Administration





     





     





     





     


 


Levofloxacin  750 mg  12/31/19 09:00  12/31/19 09:08





  Levaquin  PO   750 mg





  DAILY DOMINIQUE   Administration





     





     





     





     


 


Methylprednisolone  60 mg  12/31/19 09:09  12/31/19 09:14





  Solu-Medrol (40mg Vial)  IVP   60 mg





  TID DOMINIQUE   Administration





     





     





     





     


 


Sodium Chloride  10 ml  12/30/19 19:47  12/31/19 14:55





  Normal Saline Flush 0.9%  IVP   10 ml





  PRN PRN   Administration





  AS NEEDED PER PROVIDER ORDERS   





     





     





     


 


Sodium Chloride  10 ml  12/31/19 01:00  12/31/19 09:08





  Normal Saline Flush 0.9%  IVP   10 ml





  0100,0900,1700 DOMINIQUE   Administration





     





     





     





     














- Lab Result


Fish Bone Diagrams: 


                                 12/31/19 05:23





                                 12/31/19 13:21





- Additional Planning


My Orders: 





My Active Orders





01/01/20 05:00


BMP - BASIC METABOLIC PANEL [CHEM] DAILYLAB 


CBC - COMP BLD CT W/AUTO DIFF [HEME] DAILYLAB 





01/02/20 05:00


BMP - BASIC METABOLIC PANEL [CHEM] DAILYLAB 


CBC - COMP BLD CT W/AUTO DIFF [HEME] DAILYLAB 





01/03/20 05:00


BMP - BASIC METABOLIC PANEL [CHEM] DAILYLAB 


CBC - COMP BLD CT W/AUTO DIFF [HEME] DAILYLAB 





12/31/19


Palliative Care Consult [CONS] Routine 





12/31/19 09:09


methylPREDNISolone SUCCINATE [SOLU-Medrol (40MG VIAL)]   60 mg IVP TID 





12/31/19 13:39


Sodium Chloride 0.65% [Ocean]   2 sprays TREVOR Q4HR PRN 





12/31/19 14:00


Sodium Chloride 0.9% [Normal Saline 0.9%] 1,000 ml  mls/hr 





12/31/19 21:00


POTASSIUM [CHEM] Timed 














Subjective





- Subjective


Patient Reports: Feeling Better





Objective


Vital Signs: 





                               Vital Signs - 24 hr











  12/30/19 12/30/19 12/30/19





  16:04 16:24 16:47


 


Temperature   


 


Heart Rate 90 89 91


 


Heart Rate [   





Brachial]   


 


Respiratory 18 20 18





Rate   


 


Blood Pressure 115/77  


 


Blood Pressure   





[Right Brachial   





artery]   


 


O2 Saturation 98  














  12/30/19 12/30/19 12/30/19





  17:04 19:00 20:28


 


Temperature   36.7 C


 


Heart Rate 91 103 H 


 


Heart Rate [   107 H





Brachial]   


 


Respiratory 18 18 20





Rate   


 


Blood Pressure 109/79 134/75 H 


 


Blood Pressure   138/92 H





[Right Brachial   





artery]   


 


O2 Saturation 100 97 98














  12/30/19 12/30/19 12/31/19





  23:00 23:25 00:25


 


Temperature 36.6 C  36.8 C


 


Heart Rate 73 93 


 


Heart Rate [   83





Brachial]   


 


Respiratory 20 20 16





Rate   


 


Blood Pressure   


 


Blood Pressure   104/52 L





[Right Brachial   





artery]   


 


O2 Saturation 96  95














  12/31/19 12/31/19 12/31/19





  03:15 03:55 04:00


 


Temperature 36.6 C  


 


Heart Rate   73


 


Heart Rate [ 88  





Brachial]   


 


Respiratory 18  20





Rate   


 


Blood Pressure   


 


Blood Pressure 128/70  





[Right Brachial   





artery]   


 


O2 Saturation 97 96 














  12/31/19 12/31/19 12/31/19





  07:46 08:03 11:06


 


Temperature 36.5 C  


 


Heart Rate  85 


 


Heart Rate [ 80  





Brachial]   


 


Respiratory 16 18 





Rate   


 


Blood Pressure   


 


Blood Pressure 101/55 L  





[Right Brachial   





artery]   


 


O2 Saturation 97  96














  12/31/19 12/31/19 12/31/19





  11:40 12:33 14:59


 


Temperature  36.5 C 


 


Heart Rate 74  82


 


Heart Rate [  104 H 





Brachial]   


 


Respiratory 16 20 16





Rate   


 


Blood Pressure   


 


Blood Pressure  140/66 H 





[Right Brachial   





artery]   


 


O2 Saturation  93 














  12/31/19





  15:36


 


Temperature 36.6 C


 


Heart Rate 


 


Heart Rate [ 84





Brachial] 


 


Respiratory 20





Rate 


 


Blood Pressure 


 


Blood Pressure 131/58 H





[Right Brachial 





artery] 


 


O2 Saturation 99








                                     Oxygen











O2 Source                      Nasal cannula


 


Oxygen Flow Rate               4














I&O (Last 24 Hrs): 





                          Intake and Output Totals x24h











 12/29/19 12/30/19 12/31/19





 23:59 23:59 23:59


 


Intake Total  450 780


 


Balance  450 780











General: Alert, Oriented x3, No acute distress


HEENT: Atraumatic, PERRLA, EOMI


Neck: Supple


Lymphatic: no adenopathy


Neuro: Alert, Non Focal, Oriented Times 3


Cardiovascular: Regular rate, Normal S1, Normal S2


Respiratory: Chest non-tender, No respiratory distress


Abdomen: Normal bowel sounds, Soft





- Results


Results: 





                               Laboratory Results











WBC  14.1 x10^3/uL (4.8-10.8)  H  12/31/19  05:23    


 


RBC  4.07 10^6/uL (4.70-6.10)  L  12/31/19  05:23    


 


Hgb  11.5 g/dL (14.0-18.0)  L  12/31/19  05:23    


 


Hct  37.7 % (42.0-52.0)  L  12/31/19  05:23    


 


MCV  92.6 fL (80.0-94.0)   12/31/19  05:23    


 


MCH  28.3 pg (27.0-31.0)   12/31/19  05:23    


 


MCHC  30.5 g/dL (32.0-36.0)  L  12/31/19  05:23    


 


RDW  15.2 % (12.0-15.0)  H  12/31/19  05:23    


 


Plt Count  323 10^3/uL (130-450)   12/31/19  05:23    


 


MPV  8.6 fL (7.4-11.4)   12/31/19  05:23    


 


Neut # (Auto)  12.8 10^3/uL (1.5-6.6)  H  12/31/19  05:23    


 


Lymph # (Auto)  0.5 10^3/uL (1.5-3.5)  L  12/31/19  05:23    


 


Mono # (Auto)  0.7 10^3/uL (0.0-1.0)   12/31/19  05:23    


 


Eos # (Auto)  0.0 10^3/uL (0.0-0.7)   12/31/19  05:23    


 


Baso # (Auto)  0.0 10^3/uL (0.0-0.1)   12/31/19  05:23    


 


Absolute Nucleated RBC  0.00 x10^3/uL  12/31/19  05:23    


 


Nucleated RBC %  0.0 /100WBC  12/31/19  05:23    


 


Sodium  133 mmol/L (135-145)  L  12/31/19  05:23    


 


Potassium  5.8 mmol/L (3.5-5.0)  H  12/31/19  13:21    


 


Chloride  96 mmol/L (101-111)  L  12/31/19  05:23    


 


Carbon Dioxide  27 mmol/L (21-32)   12/31/19  05:23    


 


Anion Gap  10.0  (6-13)   12/31/19  05:23    


 


BUN  21 mg/dL (6-20)  H  12/31/19  05:23    


 


Creatinine  1.0 mg/dL (0.6-1.2)   12/31/19  05:23    


 


Estimated GFR (MDRD)  74  (>89)  L  12/31/19  05:23    


 


Glucose  207 mg/dL ()  H  12/31/19  05:23    


 


POC Whole Bld Glucose  174 mg/dL (70 - 100)  H  12/31/19  11:09    


 


Glycated Hemoglobin  8.1 % (4.6-6.2)  H  12/31/19  05:23    


 


Estim Average Glucose  186  ()  H  12/31/19  05:23    


 


Calcium  8.7 mg/dL (8.5-10.3)   12/31/19  05:23    


 


Phosphorus  4.4 mg/dL (2.5-4.6)   12/31/19  05:23    


 


Magnesium  2.4 mg/dL (1.7-2.8)   12/31/19  05:23    


 


Total Bilirubin  0.4 mg/dL (0.2-1.0)   12/30/19  15:29    


 


AST  22 IU/L (10-42)   12/30/19  15:29    


 


ALT  21 IU/L (10-60)   12/30/19  15:29    


 


Alkaline Phosphatase  64 IU/L ()   12/30/19  15:29    


 


Troponin I High Sens  6.8 ng/L (2.3-19.7)   12/30/19  15:29    


 


B-Natriuretic Peptide  34 pg/mL (5-100)   12/30/19  15:29    


 


Total Protein  7.6 g/dL (6.7-8.2)   12/30/19  15:29    


 


Albumin  4.1 g/dL (3.2-5.5)   12/30/19  15:29    


 


Globulin  3.5 g/dL (2.1-4.2)   12/30/19  15:29    


 


Albumin/Globulin Ratio  1.2  (1.0-2.2)   12/30/19  15:29    


 


Lipase  26 U/L (22-51)   12/30/19  15:29    


 


Influenza A (Rapid)  Negative  (Negative)   12/30/19  16:00    


 


Influenza B (Rapid)  Negative  (Negative)   12/30/19  16:00    














ABX Reporting


Has patient been on IV antibiotics over the past 48 hours?: Yes





Current Medications





- Current Medications


Current Medications: 








Active Medications





Acetaminophen (Tylenol)  650 mg PO Q4HR PRN


   PRN Reason: Pain 1 to 4


Acetaminophen/Codeine Phosphate (Tylenol #3)  1 tab PO Q6HR PRN


   PRN Reason: PAIN


   Last Admin: 12/31/19 13:30 Dose:  1 tab


Albuterol ()  2.5 mg INH RTQ4H PRN


   PRN Reason: Wheezing


Albuterol/Ipratropium (Duoneb)  3 ml INH RTQ4H DOMINIQUE


   Last Admin: 12/31/19 14:59 Dose:  3 ml


Benzonatate (Tessalon)  100 mg PO TID PRN


   PRN Reason: Cough


   Last Admin: 12/31/19 09:09 Dose:  100 mg


Budesonide (Pulmicort)  0.5 mg INH RTBID DOMINIQUE


Enoxaparin Sodium (Lovenox)  40 mg SUBQ DAILY [facility]


   Last Admin: 12/31/19 09:08 Dose:  40 mg


Formoterol Fumarate (Perforomist)  20 mcg INH RTBID DOMINIQUE


Sodium Chloride (Normal Saline 0.9%)  1,000 mls @ 100 mls/hr IV .Q10H [facility]


   Stop: 01/01/20 09:59


   Last Admin: 12/31/19 14:54 Dose:  100 mls/hr


Insulin Aspart (Novolog)  1 - 9 unit SUBQ 0800,1200,1700,2100 [facility]; Protocol


   Last Admin: 12/31/19 17:24 Dose:  3 unit


Insulin Glargine (Lantus Solostar)  5 unit SUBQ QPM [facility]


   Last Admin: 12/30/19 21:16 Dose:  5 unit


Levofloxacin (Levaquin)  750 mg PO DAILY [facility]


   Last Admin: 12/31/19 09:08 Dose:  750 mg


Methylprednisolone (Solu-Medrol (40mg Vial))  60 mg IVP TID [facility]


   Last Admin: 12/31/19 17:23 Dose:  60 mg


Sodium Chloride (Normal Saline Flush 0.9%)  10 ml IVP PRN PRN


   PRN Reason: AS NEEDED PER PROVIDER ORDERS


   Last Admin: 12/31/19 14:55 Dose:  10 ml


Sodium Chloride (Normal Saline Flush 0.9%)  10 ml IVP 0100,0900,1700 [facility]


   Last Admin: 12/31/19 17:23 Dose:  10 ml


Sodium Chloride (Ocean)  2 sprays TREVOR Q4HR PRN


   PRN Reason: Nasal Congestion


   Last Admin: 12/31/19 17:30 Dose:  1 spray


Zolpidem Tartrate (Ambien)  5 mg PO QPM PRN


   PRN Reason: Insomnia





                                        





Acetaminophen/Cod 300/30 [Tylenol #3] 1 - 2 tab PO Q6H PRN 02/02/17 


Albuterol Sulfate [Proair Hfa Inhaler] 2 puffs INH Q4H PRN 02/02/17 


Fluticasone 220 Mcg [Flovent] 2 puffs INH BID 02/02/17 


Ipratropium/Albuterol [Combivent Respimat] 1 puffs INH QID 02/02/17 


Salmeterol Xinafoate [Serevent Diskus] 1 puffs INH BID 02/02/17 


lisinopriL [Lisinopril] 10 mg PO DAILY 02/02/17 


metFORMIN [Glucophage] 1,000 mg PO DAILY PM 02/02/17 


metFORMIN [Glucophage] 1,500 mg PO QDBREAKFAST 02/02/17 


Ascorbic Acid [Vitamin C] 500 mg PO DAILY 12/31/19 


Azithromycin 500 mg PO DAILY 12/31/19 


Etodolac [Etodolac ER] 500 mg PO DAILY 12/31/19 


Pioglitazone [Actos] 15 mg PO DAILY 12/31/19 


Potassium/Calcium/Magnes/Manga [Emergen-C Electro Mix Packet] 1 each PO DAILY 

12/31/19

## 2020-01-01 LAB
ANION GAP SERPL CALCULATED.4IONS-SCNC: 7 MMOL/L (ref 6–13)
BASOPHILS NFR BLD AUTO: 0 10^3/UL (ref 0–0.1)
BASOPHILS NFR BLD AUTO: 0.2 %
BUN SERPL-MCNC: 19 MG/DL (ref 6–20)
CALCIUM UR-MCNC: 9 MG/DL (ref 8.5–10.3)
CHLORIDE SERPL-SCNC: 102 MMOL/L (ref 101–111)
CO2 SERPL-SCNC: 28 MMOL/L (ref 21–32)
CREAT SERPLBLD-SCNC: 0.9 MG/DL (ref 0.6–1.2)
EOSINOPHIL # BLD AUTO: 0 10^3/UL (ref 0–0.7)
EOSINOPHIL NFR BLD AUTO: 0 %
ERYTHROCYTE [DISTWIDTH] IN BLOOD BY AUTOMATED COUNT: 15 % (ref 12–15)
GFRSERPLBLD MDRD-ARVRAT: 84 ML/MIN/{1.73_M2} (ref 89–?)
GLUCOSE SERPL-MCNC: 113 MG/DL (ref 70–100)
HGB UR QL STRIP: 11.6 G/DL (ref 14–18)
LYMPHOCYTES # SPEC AUTO: 0.4 10^3/UL (ref 1.5–3.5)
LYMPHOCYTES NFR BLD AUTO: 2.9 %
MCH RBC QN AUTO: 28.4 PG (ref 27–31)
MCHC RBC AUTO-ENTMCNC: 30.9 G/DL (ref 32–36)
MCV RBC AUTO: 91.9 FL (ref 80–94)
MONOCYTES # BLD AUTO: 0.6 10^3/UL (ref 0–1)
MONOCYTES NFR BLD AUTO: 4.2 %
NEUTROPHILS # BLD AUTO: 12.4 10^3/UL (ref 1.5–6.6)
NEUTROPHILS # SNV AUTO: 13.5 X10^3/UL (ref 4.8–10.8)
NEUTROPHILS NFR BLD AUTO: 91.8 %
PDW BLD AUTO: 8.3 FL (ref 7.4–11.4)
PLATELET # BLD: 332 10^3/UL (ref 130–450)
RBC MAR: 4.09 10^6/UL (ref 4.7–6.1)
SODIUM SERPLBLD-SCNC: 137 MMOL/L (ref 135–145)

## 2020-01-01 RX ADMIN — METHYLPREDNISOLONE SODIUM SUCCINATE SCH MG: 40 INJECTION, POWDER, FOR SOLUTION INTRAMUSCULAR; INTRAVENOUS at 14:01

## 2020-01-01 RX ADMIN — BUDESONIDE SCH MG: 0.5 SUSPENSION RESPIRATORY (INHALATION) at 05:30

## 2020-01-01 RX ADMIN — BENZONATATE PRN MG: 100 CAPSULE ORAL at 00:35

## 2020-01-01 RX ADMIN — ENOXAPARIN SODIUM SCH MG: 100 INJECTION SUBCUTANEOUS at 08:40

## 2020-01-01 RX ADMIN — SODIUM CHLORIDE SCH MLS/HR: 9 INJECTION, SOLUTION INTRAVENOUS at 00:36

## 2020-01-01 RX ADMIN — INSULIN ASPART SCH UNIT: 100 INJECTION, SOLUTION INTRAVENOUS; SUBCUTANEOUS at 11:07

## 2020-01-01 RX ADMIN — INSULIN ASPART SCH UNIT: 100 INJECTION, SOLUTION INTRAVENOUS; SUBCUTANEOUS at 21:12

## 2020-01-01 RX ADMIN — INSULIN GLARGINE SCH UNIT: 100 INJECTION, SOLUTION SUBCUTANEOUS at 21:12

## 2020-01-01 RX ADMIN — IPRATROPIUM BROMIDE AND ALBUTEROL SULFATE SCH ML: 2.5; .5 SOLUTION RESPIRATORY (INHALATION) at 05:30

## 2020-01-01 RX ADMIN — IPRATROPIUM BROMIDE AND ALBUTEROL SULFATE SCH ML: 2.5; .5 SOLUTION RESPIRATORY (INHALATION) at 21:40

## 2020-01-01 RX ADMIN — SODIUM CHLORIDE, PRESERVATIVE FREE SCH ML: 5 INJECTION INTRAVENOUS at 08:40

## 2020-01-01 RX ADMIN — INSULIN ASPART SCH: 100 INJECTION, SOLUTION INTRAVENOUS; SUBCUTANEOUS at 08:29

## 2020-01-01 RX ADMIN — FORMOTEROL FUMARATE DIHYDRATE SCH MCG: 20 SOLUTION RESPIRATORY (INHALATION) at 05:30

## 2020-01-01 RX ADMIN — SODIUM CHLORIDE, PRESERVATIVE FREE SCH ML: 5 INJECTION INTRAVENOUS at 16:48

## 2020-01-01 RX ADMIN — BUDESONIDE SCH MG: 0.5 SUSPENSION RESPIRATORY (INHALATION) at 21:40

## 2020-01-01 RX ADMIN — SODIUM CHLORIDE, PRESERVATIVE FREE PRN ML: 5 INJECTION INTRAVENOUS at 14:02

## 2020-01-01 RX ADMIN — IPRATROPIUM BROMIDE AND ALBUTEROL SULFATE SCH ML: 2.5; .5 SOLUTION RESPIRATORY (INHALATION) at 16:50

## 2020-01-01 RX ADMIN — ACETAMINOPHEN AND CODEINE PHOSPHATE PRN TAB: 300; 30 TABLET ORAL at 06:09

## 2020-01-01 RX ADMIN — ACETAMINOPHEN AND CODEINE PHOSPHATE PRN TAB: 300; 30 TABLET ORAL at 21:10

## 2020-01-01 RX ADMIN — METHYLPREDNISOLONE SODIUM SUCCINATE SCH MG: 40 INJECTION, POWDER, FOR SOLUTION INTRAMUSCULAR; INTRAVENOUS at 21:11

## 2020-01-01 RX ADMIN — INSULIN ASPART SCH: 100 INJECTION, SOLUTION INTRAVENOUS; SUBCUTANEOUS at 16:48

## 2020-01-01 RX ADMIN — FORMOTEROL FUMARATE DIHYDRATE SCH MCG: 20 SOLUTION RESPIRATORY (INHALATION) at 21:40

## 2020-01-01 RX ADMIN — SODIUM CHLORIDE, PRESERVATIVE FREE SCH: 5 INJECTION INTRAVENOUS at 00:38

## 2020-01-01 RX ADMIN — IPRATROPIUM BROMIDE AND ALBUTEROL SULFATE SCH ML: 2.5; .5 SOLUTION RESPIRATORY (INHALATION) at 12:56

## 2020-01-01 RX ADMIN — ACETAMINOPHEN AND CODEINE PHOSPHATE PRN TAB: 300; 30 TABLET ORAL at 00:35

## 2020-01-01 RX ADMIN — METHYLPREDNISOLONE SODIUM SUCCINATE SCH MG: 40 INJECTION, POWDER, FOR SOLUTION INTRAMUSCULAR; INTRAVENOUS at 06:03

## 2020-01-01 NOTE — XRAY REPORT
Reason:  SOB

Procedure Date:  01/01/2020   

Accession Number:  [id number] / [id number]                    

Procedure:  XR  - Chest 1 View X-Ray CPT Code:  45437

 

***Final Report***

 

 

FULL RESULT:

 

 

EXAM:

CHEST RADIOGRAPHY

 

EXAM DATE: 1/1/2020 06:00 PM.

 

CLINICAL HISTORY: Shortness of breath

 

COMPARISON: CHEST 1 VIEW 12/30/2019 3:38 PM.

 

TECHNIQUE: 1 view.

 

FINDINGS:

Lungs/Pleura: The stellate-like opacity in the right upper lobe is 

reidentified. The lungs are hyperinflated with emphysematous changes. 

Bibasilar parenchymal scarring versus atelectasis is unchanged. There is 

no pleural effusion or pneumothorax.

 

Mediastinum: Within exam limitations, the cardiomediastinal contour is 

normal.

 

Other: None.

 

IMPRESSION:

Unchanged stellate-like right upper lobe nodule. Recommend chest CT for 

further evaluation.

Unchanged bibasilar opacities, likely scar versus atelectasis.

 

RADIA

## 2020-01-01 NOTE — PROVIDER PROGRESS NOTE
Assessment/Plan





- Problem List


(1) COPD exacerbation


Assessment/Plan: 





1/1 pt report he felt more SOB, felt more respiratory distress. CXR reveals 

unchanged. 


continue IV of solu-metrol


continue INH treatment


pt still report SOB, increase of steroid dosage to 60 mg tid from 40 mg bid. pt 

took 60 mg Prednisone at home and failed


resume home INH steroid, long acting beta-agonist, and albuterol


continue supplement of O2





(2) Hyperkalemia


Conclusion/Plan: 


1/1 resolved. INH albuterol 10mg for once and insulin once with D10. 

hyperkalemia is likely caused by pt's increased steroid usage.





continue increase, unknown etiology, pt is on insulin and beta-agonist. order K

aylaxine


continue lab monitor





(3) Chronic respiratory failure with hypoxia


Conclusion/Plan: 


1/1stable sats on supplement of O2


continue home INH steroid, long acting beta-agonist, and albuterol


continue supplement of O2





pt present SOB, increase of steroid dosage to 60 mg tid from 40 mg bid. pt took 

60 mg Prednisone at home and failed


resume home INH steroid, long acting beta-agonist, and albuterol


continue supplement of O2





(4) Lower extremity edema


Conclusion/Plan: 


stable. pt has no issue to walk. advise rise his legs and Will consider 

compression stockings if patient is agreeable.








(5) Diabetes mellitus type II, non insulin dependent


Conclusion/Plan: 


A1C 8.1, continue slide scale, hypoglycemia protocol





(6) Right upper lobe pulmonary nodule


Conclusion/Plan: 


advise pt followup his pulmonologist to monitor his nodule as his schedule. 

Concern for right upper lobe lung nodule on the chest x-ray.  The patient has 

been getting outpatient CT scans of the chest every 3 months and is scheduled 

for 1 in 2 weeks.  I did inform him that chest x-ray was concerning for possible

nodule.  He states he will follow-up with his pulmonologist.





(7)anxiety


pt present anxiety, pt has increased steroid usage for his COPD


add ativan PRN for anxiety








- Current Meds


Current Meds: 





                               Current Medications











Generic Name Dose Route Start Last Admin





  Trade Name Freq  PRN Reason Stop Dose Admin


 


Acetaminophen/Codeine Phosphate  1 tab  12/30/19 21:29  01/01/20 06:09





  Tylenol #3  PO   1 tab





  Q6HR PRN   Administration





  PAIN   





     





     





     


 


Albuterol/Ipratropium  3 ml  12/31/19 11:00  01/01/20 12:56





  Duoneb  INH   3 ml





  RTQ4H DOMINIQUE   Administration





     





     





     





     


 


Benzonatate  100 mg  12/30/19 19:52  01/01/20 00:35





  Tessalon  PO   100 mg





  TID PRN   Administration





  Cough   





     





     





     


 


Budesonide  0.5 mg  12/31/19 19:00  01/01/20 05:30





  Pulmicort  INH   0.5 mg





  RTBID DOMINIQUE   Administration





     





     





     





     


 


Enoxaparin Sodium  40 mg  12/31/19 09:00  01/01/20 08:40





  Lovenox  SUBQ   40 mg





  DAILY DOMINIQUE   Administration





     





     





     





     


 


Formoterol Fumarate  20 mcg  12/31/19 19:00  01/01/20 05:30





  Perforomist  INH   20 mcg





  RTBID DOMINIQUE   Administration





     





     





     





     


 


Insulin Aspart  1 - 9 unit  12/30/19 21:00  01/01/20 11:07





  Novolog  SUBQ   9 unit





  0800,1200,1700,2100 DOMINIQUE   Administration





     





     





  Protocol   





     


 


Insulin Glargine  5 unit  12/30/19 21:00  12/31/19 22:11





  Lantus Solostar  SUBQ   5 unit





  QPM DOMINIQUE   Administration





     





     





     





     


 


Levofloxacin  750 mg  12/31/19 09:00  01/01/20 08:40





  Levaquin  PO   750 mg





  DAILY DOMINIQUE   Administration





     





     





     





     


 


Lorazepam  0.5 mg  01/01/20 12:47  01/01/20 14:02





  Ativan  PO   0.5 mg





  Q6H PRN   Administration





  Anxiety   





     





     





     


 


Methylprednisolone  60 mg  12/31/19 09:09  01/01/20 14:01





  Solu-Medrol (40mg Vial)  IVP   60 mg





  TID DOMINIQUE   Administration





     





     





     





     


 


Sodium Chloride  10 ml  12/30/19 19:47  01/01/20 14:02





  Normal Saline Flush 0.9%  IVP   10 ml





  PRN PRN   Administration





  AS NEEDED PER PROVIDER ORDERS   





     





     





     


 


Sodium Chloride  10 ml  12/31/19 01:00  01/01/20 08:40





  Normal Saline Flush 0.9%  IVP   10 ml





  0100,0900,1700 DOMINIQUE   Administration





     





     





     





     


 


Sodium Chloride  2 sprays  12/31/19 13:39  12/31/19 17:30





  [place]  TREVOR   1 spray





  Q4HR PRN   Administration





  Nasal Congestion   





     





     





     














- Lab Result


[place] Diagrams: 


                                 01/01/20 04:55





                                 01/01/20 13:11





- Additional Planning


My Orders: 





My Active Orders





01/01/20 08:03


Resp Teach Nebulizer/MDI [RC] .ONCE 





01/01/20 12:47


LORazepam [Ativan]   0.5 mg PO Q6H PRN 





01/02/20 05:00


BMP - BASIC METABOLIC PANEL [CHEM] DAILYLAB 


CBC - COMP BLD CT W/AUTO DIFF [HEME] DAILYLAB 





01/03/20 05:00


BMP - BASIC METABOLIC PANEL [CHEM] DAILYLAB 


CBC - COMP BLD CT W/AUTO DIFF [HEME] DAILYLAB 





12/31/19 19:00


Budesonide [Pulmicort]   0.5 mg INH RTBID 


Formoterol Fumarate [Perforomist]   20 mcg INH RTBID 














Subjective





- Subjective


Patient Reports: Shortness of Breath





Objective


Vital Signs: 





                               Vital Signs - 24 hr











  12/31/19 12/31/19 01/01/20





  19:00 21:19 00:09


 


Temperature 36.8 C  36.5 C


 


Heart Rate  92 


 


Heart Rate [ 87  85





Brachial]   


 


Respiratory 20 16 20





Rate   


 


Blood Pressure   130/73





[Left Brachial   





artery]   


 


Blood Pressure 103/81 H  





[Right Brachial   





artery]   


 


O2 Saturation 97  98














  01/01/20 01/01/20 01/01/20





  05:00 05:18 07:25


 


Temperature 36.2 C L  36.5 C


 


Heart Rate  75 


 


Heart Rate [ 85  76





Brachial]   


 


Respiratory 18 20 18





Rate   


 


Blood Pressure 140/74 H  





[Left Brachial   





artery]   


 


Blood Pressure   108/65





[Right Brachial   





artery]   


 


O2 Saturation 98  96














  01/01/20 01/01/20 01/01/20





  09:01 11:00 13:00


 


Temperature  36.6 C 


 


Heart Rate 84  114 H


 


Heart Rate [  104 H 





Brachial]   


 


Respiratory 16 18 16





Rate   


 


Blood Pressure   





[Left Brachial   





artery]   


 


Blood Pressure  122/66 





[Right Brachial   





artery]   


 


O2 Saturation  96 














  01/01/20





  15:44


 


Temperature 36.7 C


 


Heart Rate 


 


Heart Rate [ 100





Brachial] 


 


Respiratory 20





Rate 


 


Blood Pressure 





[Left Brachial 





artery] 


 


Blood Pressure 131/81 H





[Right Brachial 





artery] 


 


O2 Saturation 98








                                     Oxygen











O2 Source                      Nasal cannula


 


Oxygen Flow Rate               4














I&O (Last 24 Hrs): 





                          Intake and Output Totals x24h











 12/30/19 12/31/19 01/01/20





 23:59 23:59 23:59


 


Intake Total 450 1230 3993.333


 


Output Total  600 1650


 


Balance .333











General: Alert, Mild distress


HEENT: Atraumatic, PERRLA


Neck: Supple


Lymphatic: no adenopathy


Neuro: Alert, Non Focal, Oriented Times 3


Cardiovascular: Regular rate, Normal S1, Normal S2


Respiratory: Chest non-tender


Abdomen: Normal bowel sounds, Soft


Extremities: Normal pulses





- Results


Results: 





                               Laboratory Results











WBC  13.5 x10^3/uL (4.8-10.8)  H  01/01/20  04:55    


 


RBC  4.09 10^6/uL (4.70-6.10)  L  01/01/20  04:55    


 


Hgb  11.6 g/dL (14.0-18.0)  L  01/01/20  04:55    


 


Hct  37.6 % (42.0-52.0)  L  01/01/20  04:55    


 


MCV  91.9 fL (80.0-94.0)   01/01/20  04:55    


 


MCH  28.4 pg (27.0-31.0)   01/01/20  04:55    


 


MCHC  30.9 g/dL (32.0-36.0)  L  01/01/20  04:55    


 


RDW  15.0 % (12.0-15.0)   01/01/20  04:55    


 


Plt Count  332 10^3/uL (130-450)   01/01/20  04:55    


 


MPV  8.3 fL (7.4-11.4)   01/01/20  04:55    


 


Neut # (Auto)  12.4 10^3/uL (1.5-6.6)  H  01/01/20  04:55    


 


Lymph # (Auto)  0.4 10^3/uL (1.5-3.5)  L  01/01/20  04:55    


 


Mono # (Auto)  0.6 10^3/uL (0.0-1.0)   01/01/20  04:55    


 


Eos # (Auto)  0.0 10^3/uL (0.0-0.7)   01/01/20  04:55    


 


Baso # (Auto)  0.0 10^3/uL (0.0-0.1)   01/01/20  04:55    


 


Absolute Nucleated RBC  0.00 x10^3/uL  01/01/20  04:55    


 


Nucleated RBC %  0.0 /100WBC  01/01/20  04:55    


 


Sodium  137 mmol/L (135-145)   01/01/20  04:55    


 


Potassium  4.6 mmol/L (3.5-5.0)   01/01/20  13:11    


 


Chloride  102 mmol/L (101-111)   01/01/20  04:55    


 


Carbon Dioxide  28 mmol/L (21-32)   01/01/20  04:55    


 


Anion Gap  7.0  (6-13)   01/01/20  04:55    


 


BUN  19 mg/dL (6-20)   01/01/20  04:55    


 


Creatinine  0.9 mg/dL (0.6-1.2)   01/01/20  04:55    


 


Estimated GFR (MDRD)  84  (>89)  L  01/01/20  04:55    


 


Glucose  113 mg/dL ()  H  01/01/20  04:55    


 


POC Whole Bld Glucose  329 mg/dL (70 - 100)  H  01/01/20  10:58    


 


Glycated Hemoglobin  8.1 % (4.6-6.2)  H  12/31/19  05:23    


 


Estim Average Glucose  186  ()  H  12/31/19  05:23    


 


Calcium  9.0 mg/dL (8.5-10.3)   01/01/20  04:55    


 


Phosphorus  4.4 mg/dL (2.5-4.6)   12/31/19  05:23    


 


Magnesium  2.4 mg/dL (1.7-2.8)   12/31/19  05:23    


 


Total Bilirubin  0.4 mg/dL (0.2-1.0)   12/30/19  15:29    


 


AST  22 IU/L (10-42)   12/30/19  15:29    


 


ALT  21 IU/L (10-60)   12/30/19  15:29    


 


Alkaline Phosphatase  64 IU/L ()   12/30/19  15:29    


 


Troponin I High Sens  6.8 ng/L (2.3-19.7)   12/30/19  15:29    


 


B-Natriuretic Peptide  34 pg/mL (5-100)   12/30/19  15:29    


 


Total Protein  7.6 g/dL (6.7-8.2)   12/30/19  15:29    


 


Albumin  4.1 g/dL (3.2-5.5)   12/30/19  15:29    


 


Globulin  3.5 g/dL (2.1-4.2)   12/30/19  15:29    


 


Albumin/Globulin Ratio  1.2  (1.0-2.2)   12/30/19  15:29    


 


Lipase  26 U/L (22-51)   12/30/19  15:29    


 


Influenza A (Rapid)  Negative  (Negative)   12/30/19  16:00    


 


Influenza B (Rapid)  Negative  (Negative)   12/30/19  16:00    














ABX Reporting


Has patient been on IV antibiotics over the past 48 hours?: Yes





Current Medications





- Current Medications


Current Medications: 








Active Medications





Acetaminophen (Tylenol)  650 mg PO Q4HR PRN


   PRN Reason: Pain 1 to 4


Acetaminophen/Codeine Phosphate (Tylenol #3)  1 tab PO Q6HR PRN


   PRN Reason: PAIN


   Last Admin: 01/01/20 21:10 Dose:  1 tab


Albuterol ()  2.5 mg INH RTQ4H PRN


   PRN Reason: Wheezing


Albuterol/Ipratropium (Duoneb)  3 ml INH RTQ4H DOMINIQUE


   Last Admin: 01/01/20 21:40 Dose:  3 ml


Benzonatate (Tessalon)  100 mg PO TID PRN


   PRN Reason: Cough


   Last Admin: 01/01/20 00:35 Dose:  100 mg


Budesonide (Pulmicort)  0.5 mg INH RTBID [facility]


   Last Admin: 01/01/20 21:40 Dose:  0.5 mg


Enoxaparin Sodium (Lovenox)  40 mg SUBQ DAILY [facility]


   Last Admin: 01/01/20 08:40 Dose:  40 mg


Formoterol Fumarate (Perforomist)  20 mcg INH RTBID [facility]


   Last Admin: 01/01/20 21:40 Dose:  20 mcg


Insulin Aspart (Novolog)  1 - 9 unit SUBQ 0800,1200,1700,2100 [facility]; Protocol


   Last Admin: 01/01/20 21:12 Dose:  5 unit


Insulin Glargine (Lantus Solostar)  5 unit SUBQ QPM [facility]


   Last Admin: 01/01/20 21:12 Dose:  5 unit


Levofloxacin (Levaquin)  750 mg PO DAILY [facility]


   Last Admin: 01/01/20 08:40 Dose:  750 mg


Lorazepam (Ativan)  0.5 mg PO Q6H PRN


   PRN Reason: Anxiety


   Last Admin: 01/01/20 21:10 Dose:  0.5 mg


Methylprednisolone (Solu-Medrol (40mg Vial))  60 mg IVP TID [facility]


   Last Admin: 01/01/20 21:11 Dose:  60 mg


Sodium Chloride (Normal Saline Flush 0.9%)  10 ml IVP PRN PRN


   PRN Reason: AS NEEDED PER PROVIDER ORDERS


   Last Admin: 01/01/20 14:02 Dose:  10 ml


Sodium Chloride (Normal Saline Flush 0.9%)  10 ml IVP 0100,0900,1700 [facility]


   Last Admin: 01/01/20 16:48 Dose:  10 ml


Sodium Chloride ([place])  2 sprays TREVOR Q4HR PRN


   PRN Reason: Nasal Congestion


   Last Admin: 12/31/19 17:30 Dose:  1 spray


Temazepam (Restoril)  15 mg PO QPM PRN


   PRN Reason: Insomnia





                                        





Acetaminophen/Cod 300/30 [Tylenol #3] 1 - 2 tab PO Q6H PRN 02/02/17 


Albuterol Sulfate [Proair Hfa Inhaler] 2 puffs INH Q4H PRN 02/02/17 


Fluticasone 220 Mcg [Flovent] 2 puffs INH BID 02/02/17 


Ipratropium/Albuterol [Combivent Respimat] 1 puffs INH QID 02/02/17 


Salmeterol Xinafoate [Serevent Diskus] 1 puffs INH BID 02/02/17 


lisinopriL [Lisinopril] 10 mg PO DAILY 02/02/17 


metFORMIN [Glucophage] 1,000 mg PO DAILY PM 02/02/17 


metFORMIN [Glucophage] 1,500 mg PO QDBREAKFAST 02/02/17 


Ascorbic Acid [Vitamin C] 500 mg PO DAILY 12/31/19 


Azithromycin 500 mg PO DAILY 12/31/19 


Etodolac [Etodolac ER] 500 mg PO DAILY 12/31/19 


Pioglitazone [Actos] 15 mg PO DAILY 12/31/19 


Potassium/Calcium/Magnes/Manga [Emergen-C Electro Mix Packet] 1 each PO DAILY 

12/31/19

## 2020-01-02 VITALS — DIASTOLIC BLOOD PRESSURE: 74 MMHG | SYSTOLIC BLOOD PRESSURE: 122 MMHG

## 2020-01-02 LAB
ANION GAP SERPL CALCULATED.4IONS-SCNC: 7 MMOL/L (ref 6–13)
BASOPHILS NFR BLD AUTO: 0 10^3/UL (ref 0–0.1)
BASOPHILS NFR BLD AUTO: 0.2 %
BUN SERPL-MCNC: 23 MG/DL (ref 6–20)
CALCIUM UR-MCNC: 8.7 MG/DL (ref 8.5–10.3)
CHLORIDE SERPL-SCNC: 99 MMOL/L (ref 101–111)
CO2 SERPL-SCNC: 30 MMOL/L (ref 21–32)
CREAT SERPLBLD-SCNC: 0.8 MG/DL (ref 0.6–1.2)
EOSINOPHIL # BLD AUTO: 0 10^3/UL (ref 0–0.7)
EOSINOPHIL NFR BLD AUTO: 0 %
ERYTHROCYTE [DISTWIDTH] IN BLOOD BY AUTOMATED COUNT: 14.9 % (ref 12–15)
GFRSERPLBLD MDRD-ARVRAT: 96 ML/MIN/{1.73_M2} (ref 89–?)
GLUCOSE SERPL-MCNC: 183 MG/DL (ref 70–100)
HGB UR QL STRIP: 11.1 G/DL (ref 14–18)
LYMPHOCYTES # SPEC AUTO: 0.4 10^3/UL (ref 1.5–3.5)
LYMPHOCYTES NFR BLD AUTO: 3.1 %
MCH RBC QN AUTO: 28.1 PG (ref 27–31)
MCHC RBC AUTO-ENTMCNC: 30.4 G/DL (ref 32–36)
MCV RBC AUTO: 92.4 FL (ref 80–94)
MONOCYTES # BLD AUTO: 0.7 10^3/UL (ref 0–1)
MONOCYTES NFR BLD AUTO: 4.8 %
NEUTROPHILS # BLD AUTO: 12.7 10^3/UL (ref 1.5–6.6)
NEUTROPHILS # SNV AUTO: 14 X10^3/UL (ref 4.8–10.8)
NEUTROPHILS NFR BLD AUTO: 91 %
PDW BLD AUTO: 8.3 FL (ref 7.4–11.4)
PLATELET # BLD: 343 10^3/UL (ref 130–450)
RBC MAR: 3.95 10^6/UL (ref 4.7–6.1)
SODIUM SERPLBLD-SCNC: 136 MMOL/L (ref 135–145)

## 2020-01-02 RX ADMIN — METHYLPREDNISOLONE SODIUM SUCCINATE SCH MG: 40 INJECTION, POWDER, FOR SOLUTION INTRAMUSCULAR; INTRAVENOUS at 05:39

## 2020-01-02 RX ADMIN — FORMOTEROL FUMARATE DIHYDRATE SCH MCG: 20 SOLUTION RESPIRATORY (INHALATION) at 06:04

## 2020-01-02 RX ADMIN — ENOXAPARIN SODIUM SCH MG: 100 INJECTION SUBCUTANEOUS at 08:06

## 2020-01-02 RX ADMIN — ACETAMINOPHEN PRN MG: 325 TABLET ORAL at 14:14

## 2020-01-02 RX ADMIN — IPRATROPIUM BROMIDE AND ALBUTEROL SULFATE SCH ML: 2.5; .5 SOLUTION RESPIRATORY (INHALATION) at 06:04

## 2020-01-02 RX ADMIN — IPRATROPIUM BROMIDE AND ALBUTEROL SULFATE SCH ML: 2.5; .5 SOLUTION RESPIRATORY (INHALATION) at 10:43

## 2020-01-02 RX ADMIN — BUDESONIDE SCH MG: 0.5 SUSPENSION RESPIRATORY (INHALATION) at 06:04

## 2020-01-02 RX ADMIN — SODIUM CHLORIDE, PRESERVATIVE FREE SCH ML: 5 INJECTION INTRAVENOUS at 08:06

## 2020-01-02 RX ADMIN — ACETAMINOPHEN AND CODEINE PHOSPHATE PRN TAB: 300; 30 TABLET ORAL at 03:35

## 2020-01-02 RX ADMIN — SODIUM CHLORIDE, PRESERVATIVE FREE SCH ML: 5 INJECTION INTRAVENOUS at 00:17

## 2020-01-02 RX ADMIN — SODIUM CHLORIDE, PRESERVATIVE FREE PRN ML: 5 INJECTION INTRAVENOUS at 14:19

## 2020-01-02 RX ADMIN — INSULIN ASPART SCH UNIT: 100 INJECTION, SOLUTION INTRAVENOUS; SUBCUTANEOUS at 08:08

## 2020-01-02 RX ADMIN — INSULIN ASPART SCH UNIT: 100 INJECTION, SOLUTION INTRAVENOUS; SUBCUTANEOUS at 12:57

## 2020-01-02 RX ADMIN — ACETAMINOPHEN PRN MG: 325 TABLET ORAL at 08:06

## 2020-01-02 RX ADMIN — METHYLPREDNISOLONE SODIUM SUCCINATE SCH MG: 40 INJECTION, POWDER, FOR SOLUTION INTRAMUSCULAR; INTRAVENOUS at 14:18

## 2020-01-02 RX ADMIN — BENZONATATE PRN MG: 100 CAPSULE ORAL at 00:16

## 2020-01-02 RX ADMIN — IPRATROPIUM BROMIDE AND ALBUTEROL SULFATE SCH ML: 2.5; .5 SOLUTION RESPIRATORY (INHALATION) at 15:09

## 2020-01-02 NOTE — DISCHARGE SUMMARY
Discharge Summary


Admit Date: 12/30/19


Discharge Date: 01/02/20


Discharging Provider: XENA DELGADILLO


Primary Care Provider: Dr. Beasley


Discharge Disposition: 01 Home, Self Care


Discharge Facility Name: home





- DIAGNOSES


Admission Diagnoses: 


(1) COPD exacerbation





(2) Hyperkalemia





(3) Chronic respiratory failure with hypoxia





(4) Lower extremity edema





(5) Diabetes mellitus type II, non insulin dependent





(6) Right upper lobe pulmonary nodule











Discharge Diagnoses with Status of Each Condition: 





(1) COPD exacerbation


stable. pt has no respiratory distress. he has 96% sats on 2 liter of O2. pt 

take 2-3 liter of O2 at home. pt is prescribed nebulization machine  for his 

COPD. pt is also prescribed albuterol, pulmicort, Duoneb solution for 

nebulization machine to use.


pt has steroid and antibiotics in his home meds list


(2) Hyperkalemia


K is 5.1 on d/c. it is likely from high dosage steroid used in hospital. advise 

pt followup PCP test potassium level in one week


(3) Chronic respiratory failure with hypoxia


stable


(4) Lower extremity edema


stable


(5) Diabetes mellitus type II, non insulin dependent


stable


(6) Right upper lobe pulmonary nodule


stable, pt has the schedule to monitor every 3 months with his pulmonologist


(7)anxiety


stable, pt has home meds Ativan


(8)palliative care


pt request palliative care consult. Palliative care did consult for him, and 

advise pt followup.





- HPI


History of Present Illness: 


refer from 's HPI on 12/30/19





This is a 69-year-old male with a past medical history significant for COPD on 2

L of oxygen, type 2 diabetes who presents today complaining of worsening 

shortness of breath over the past 5 days.  States he first developed flulike 

symptoms about 1 week ago.  He had chills, malaise, body aches.  He then started

to feel short of breath about 2 days later.  He is on chronic prednisone 20 mg 

daily and he increase this to 60 mg with some improvement in his symptoms.  He 

also began taking Bactrim orally that he had available at the pharmacy.  He said

he felt better on Saturday but that his dyspnea returned and has progressively 

become more severe and so he sought medical attention today.  He has been on 

chronic steroids for many years.  He has also been on daily azithromycin 

prescribed by his pulmonologist for about the past 8 months.  He follows with 

Dr. Shon Perez at the [facility].  He no longer smokes but did smoke about

a pack and 1/2 to 3 packs a day for around 30 years.  He wears 2 L of oxygen at 

baseline and uses 3 L with exertion.  This is his second hospitalization for 

COPD exacerbation with his last being about 2 years ago.  He reports feeling 

dyspneic at rest but it is more prominent with exertion.  He is also had a 

productive cough with beige sputum.  He does report left-sided chest pain that 

is pleuritic in nature and is most profound with a deep inspiration.  The pain 

is pressure-like in nature.  He reports no prior cardiac history.  He states he 

had a stress test about 2 years ago which was unremarkable.  He is also 

complaining of lower extremity edema but he states this is chronic for him.  It 

becomes more profound when he consumes more salt in his diet.  He denies 

orthopnea.  He reports no nausea, vomiting, abdominal pain, diarrhea.





In the emergency department, he is found to be afebrile with temperature of the 

37.1.  He was tachycardic with a heart rate of 93.  Blood pressure was 131/71.  

He was slightly tachypneic with a respiratory of 20 and he was saturating 98% on

4 L of oxygen.  Labs were unremarkable except for potassium of 5.6.  Influenza 

was negative.  A chest x-ray did not reveal an obvious infiltrate.  He was 

treated with Decadron p.o. as well as a few breathing treatments with only mild 

improvement.  He still felt quite dyspneic with exertion and his oxygen 

durations dropped to the 80s and therefore medicine was consulted for admission.








- CONSULTS | PROCEDURES


Consultations: palliative care provider Alena Cruz


Procedures: 


palliative care








- HOSPITAL COURSE


Hospital Course: 


pt was admitted of shortness of breath. pt has hx of advanced COPD. pt was 

treated with IV of steroid, breath treatment, supplement of O2. pt take O2 at 

home. pt also developed elevated potassium, hyperkalemia. it is likely from incr

eased usage of steroid. pt was treated with insulin, Kayexalate. then pt's 

potassium is down to 5.1. pt has no IV of steroid at home and steroid dosage is 

reduced as well. pt is advised to see his PCP to recheck potassium level. The 

detail hospital course is as the following. 





(1) COPD exacerbation


stable. pt has no respiratory distress. he has 96% sats on 2 liter of O2. pt 

take 2-3 liter of O2 at home. pt is prescribed nebulization machine  for his 

COPD. pt is also prescribed albuterol, pulmicort, Duoneb solution for 

nebulization machine to use.


pt has steroid and antibiotics in his home meds list


(2) Hyperkalemia


K is 5.1 on d/c. it is likely from high dosage steroid used in hospital. advise 

pt followup PCP test potassium level in one week


(3) Chronic respiratory failure with hypoxia


stable


(4) Lower extremity edema


stable


(5) Diabetes mellitus type II, non insulin dependent


stable


(6) Right upper lobe pulmonary nodule


stable, pt has the schedule to monitor every 3 months with his pulmonologist


(7)anxiety


stable, pt has home meds Ativan


(8)palliative care


pt request palliative care consult. Palliative care did consult for him, and 

advise pt followup.








- ALLERGIES


Allergies/Adverse Reactions: 


                                    Allergies











Allergy/AdvReac Type Severity Reaction Status Date / Time


 


ciprofloxacin [From Cipro] Allergy  Unknown Verified 12/30/19 14:21


 


ciprofloxacin HCl * Allergy  Unknown Verified 12/30/19 14:21





[From Cipro]     


 


hydromorphone HCl * Allergy  Unknown Verified 12/30/19 14:21





[From Dilaudid]     


 


oxycodone Allergy  Unknown Verified 12/30/19 14:21


 


oxycodone HCl * Allergy  Unknown Verified 12/30/19 14:21





[From OxyContin]     














- MEDICATIONS


Home Medications: 


                                Ambulatory Orders











 Medication  Instructions  Recorded  Confirmed


 


Acetaminophen/Cod 300/30 [Tylenol 1 - 2 tab PO Q6H PRN 02/02/17 12/31/19





#3]   


 


Albuterol Sulfate [Proair Hfa 2 puffs INH Q4H PRN 02/02/17 12/31/19





Inhaler]   


 


Fluticasone 220 Mcg [Flovent] 2 puffs INH BID 02/02/17 12/31/19


 


Ipratropium/Albuterol [Combivent 1 puffs INH QID 02/02/17 12/31/19





Respimat]   


 


Salmeterol Xinafoate [Serevent 1 puffs INH BID 02/02/17 12/31/19





Diskus]   


 


lisinopriL [Lisinopril] 10 mg PO DAILY 02/02/17 12/31/19


 


metFORMIN [Glucophage] 1,000 mg PO DAILY PM 02/02/17 12/31/19


 


metFORMIN [Glucophage] 1,500 mg PO QDBREAKFAST 02/02/17 12/31/19


 


Benzonatate [Tessalon] 100 mg PO TID #30 capsule 02/06/17 12/31/19


 


diazePAM [Valium] 5 mg PO QPM #30 tablet 02/06/17 12/31/19


 


predniSONE [Deltasone] 20 mg PO DAILYWM #10 tablet 02/06/17 12/31/19


 


Ascorbic Acid [Vitamin C] 500 mg PO DAILY 12/31/19 12/31/19


 


Azithromycin 500 mg PO DAILY 12/31/19 12/31/19


 


Etodolac [Etodolac ER] 500 mg PO DAILY 12/31/19 12/31/19


 


Pioglitazone [Actos] 15 mg PO DAILY 12/31/19 12/31/19


 


Albuterol 2.5 mg INH RTQ4H PRN #50 neb 01/02/20 


 


Budesonide [Pulmicort] 0.5 mg INH RTBID #50 neb 01/02/20 


 


Ipratropium/Albuterol [Duoneb] 3 ml INH RTQ4H PRN #50 neb 01/02/20 














- PHYSICAL EXAM AT DISCHARGE


General Appearance: positive: No acute distress, Alert.  negative: Lethargic


Eyes Bilateral: positive: Normal inspection, PERRL, EOMI, No lid inflammation


ENT: positive: ENT inspection nml, Pharynx nml, No signs of dehydration.  

negative: Purulent nasal drainage


Neck: positive: Nml inspection, Thyroid nml, No JVD, Trachea midline.  negative:

 Thyromegaly, Lymphadenopathy (R), Lymphadenopathy (L), Stiff neck, Tracheal 

deviation


Respiratory: positive: Chest non-tender, No respiratory distress, Other 

(diminished lung sound bilaterally).  negative: Wheezes, Rales, Rhonchi


Cardiovascular: positive: Regular rate & rhythm, No murmur, No gallop.  

negative: Irregularly irregular, Extrasystoles, Tachycardia, Bradycardia, JVD 

present, Systolic murmur, Diastolic murmur


Peripheral Pulses: positive: 2+


Abdomen: positive: Non-tender, No organomegaly, Nml bowel sounds, No distention.

  negative: Tenderness, Guarding, Rebound


Back: positive: Nml inspection.  negative: CVA tenderness (R), CVA tenderness 

(L)


Skin: positive: Color nml, No rash, Warm, Dry.  negative: Cyanosis, Diaphoresis,

 Pallor


Extremities: positive: Non-tender, Full ROM, Nml appearance.  negative: Calf 

tenderness, Kristine's sign/cords


Neurologic/Psychiatric: positive: Oriented x3, Motor nml, Sensation nml, 

Mood/affect nml.  negative: Weakness, Sensory loss, Facial droop, Slurred/abnml 

speech, Depressed mood/affect





- LABS


Result Diagrams: 


                                 01/02/20 05:30





                                 01/02/20 05:30





- FOLLOW UP


Follow Up: 


you are prescribed nebulization machine and medications: albuterol, Duoneb, and 

pulmicort for your COPD treatment, per your request when you can not use your 

inhaler. recheck your potassium level in one week when you followup your PCP in 

one week. Your potassium is 5.1 on today, normal arrange 3.5-5.0.


you may followup your PCP in one week, check your potassium level in one week as

 well. you may followup your pulmonologist as your schedule and monitor your 

pulmonary nodule. Should your symptoms return or worsen, you may present ER or 

call 911 for help.











- TIME SPENT


Time Spent in Discharge (Minutes): 40

## 2020-01-02 NOTE — DISCHARGE PLAN
Discharge Plan


Problem Reviewed?: Yes


Disposition: 01 Home, Self Care


Condition: Poor


Prescriptions: 


Albuterol 2.5 mg INH RTQ4H PRN #50 neb


 PRN Reason: Wheezing


Budesonide [Pulmicort] 0.5 mg INH RTBID #50 neb


Ipratropium/Albuterol [Duoneb] 3 ml INH RTQ4H PRN #50 neb


 PRN Reason: Shortness Of Air/Wheezing


Diet: Diabetic


Activity Restrictions: Activity as Tolerated


Shower Restrictions: No (fall precaution)


Instruction Topics:  COPD, Hyperkalemia Dc


Health Concerns: 


COPD, hyperkalemia


Plan of Treatment: 


you are prescribed nebulization machine and medications: albuterol, Duoneb, and 

pulmicort for your COPD treatment, per your request when you can not use your 

inhaler. recheck your potassium level in one week when you followup your PCP in 

one week. Your potassium is 5.1 on today, normal arrange 3.5-5.0.


Care Goals: 


stabilization and improvement of your medical conditions


Assessment: 


discussed with you about the care plan, you understood the plan


Additional Instructions or Follow Up instructions: 


you may followup your PCP in one week, check your potassium level in one week as

 well. you may followup your pulmonologist as your schedule and monitor your 

pulmonary nodule. Should your symptoms return or worsen, you may present ER or 

call 911 for help.


No Smoking: If you smoke, Please STOP!  Call [phone] for help.


Follow-up with: 


LISY Beasley MD [Primary Care Provider] -
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
## 2021-04-04 ENCOUNTER — HOSPITAL ENCOUNTER (OUTPATIENT)
Dept: HOSPITAL 76 - EMS | Age: 71
End: 2021-04-04
Payer: MEDICARE

## 2021-04-04 DIAGNOSIS — Z03.89: Primary | ICD-10-CM

## 2021-10-01 ENCOUNTER — HOSPITAL ENCOUNTER (OUTPATIENT)
Dept: HOSPITAL 76 - EMS | Age: 71
End: 2021-10-01
Payer: MEDICARE

## 2021-10-01 DIAGNOSIS — S50.902A: Primary | ICD-10-CM
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
## 2021-10-24 ENCOUNTER — HOSPITAL ENCOUNTER (INPATIENT)
Dept: HOSPITAL 76 - ED | Age: 71
LOS: 2 days | Discharge: HOME | DRG: 871 | End: 2021-10-26
Attending: INTERNAL MEDICINE | Admitting: INTERNAL MEDICINE
Payer: MEDICARE

## 2021-10-24 ENCOUNTER — HOSPITAL ENCOUNTER (OUTPATIENT)
Dept: HOSPITAL 76 - EMS | Age: 71
Discharge: ANOTHER HOSPITAL | End: 2021-10-24
Payer: MEDICARE

## 2021-10-24 DIAGNOSIS — R65.20: ICD-10-CM

## 2021-10-24 DIAGNOSIS — Z79.891: ICD-10-CM

## 2021-10-24 DIAGNOSIS — F41.9: ICD-10-CM

## 2021-10-24 DIAGNOSIS — J18.9: ICD-10-CM

## 2021-10-24 DIAGNOSIS — N17.9: ICD-10-CM

## 2021-10-24 DIAGNOSIS — Z77.098: ICD-10-CM

## 2021-10-24 DIAGNOSIS — Z20.822: ICD-10-CM

## 2021-10-24 DIAGNOSIS — Z99.81: ICD-10-CM

## 2021-10-24 DIAGNOSIS — R60.0: ICD-10-CM

## 2021-10-24 DIAGNOSIS — A41.9: Primary | ICD-10-CM

## 2021-10-24 DIAGNOSIS — R53.83: Primary | ICD-10-CM

## 2021-10-24 DIAGNOSIS — J96.21: ICD-10-CM

## 2021-10-24 DIAGNOSIS — E11.9: ICD-10-CM

## 2021-10-24 DIAGNOSIS — Z66: ICD-10-CM

## 2021-10-24 DIAGNOSIS — Z79.899: ICD-10-CM

## 2021-10-24 DIAGNOSIS — R07.81: ICD-10-CM

## 2021-10-24 DIAGNOSIS — N40.0: ICD-10-CM

## 2021-10-24 DIAGNOSIS — E87.5: ICD-10-CM

## 2021-10-24 DIAGNOSIS — J43.9: ICD-10-CM

## 2021-10-24 DIAGNOSIS — I35.0: ICD-10-CM

## 2021-10-24 DIAGNOSIS — R06.02: ICD-10-CM

## 2021-10-24 DIAGNOSIS — Z79.84: ICD-10-CM

## 2021-10-24 DIAGNOSIS — J42: ICD-10-CM

## 2021-10-24 DIAGNOSIS — R50.9: ICD-10-CM

## 2021-10-24 DIAGNOSIS — Z87.891: ICD-10-CM

## 2021-10-24 LAB
ALBUMIN DIAFP-MCNC: 3.9 G/DL (ref 3.2–5.5)
ALBUMIN/GLOB SERPL: 1.3 {RATIO} (ref 1–2.2)
ALP SERPL-CCNC: 86 IU/L (ref 42–121)
ALT SERPL W P-5'-P-CCNC: 17 IU/L (ref 10–60)
ANION GAP SERPL CALCULATED.4IONS-SCNC: 11 MMOL/L (ref 6–13)
AST SERPL W P-5'-P-CCNC: 19 IU/L (ref 10–42)
B PARAPERT DNA SPEC QL NAA+PROBE: NOT DETECTED
B PERT DNA SPEC QL NAA+PROBE: NOT DETECTED
BASE EXCESS BLDV CALC-SCNC: -1.4 MMOL/L
BASOPHILS # BLD MANUAL: 0 10^3/UL (ref 0–0.1)
BASOPHILS NFR BLD AUTO: 0.9 %
BILIRUB BLD-MCNC: 0.6 MG/DL (ref 0.2–1)
BILIRUB UR QL CFM: POSITIVE
BUN SERPL-MCNC: 31 MG/DL (ref 6–20)
C PNEUM DNA NPH QL NAA+NON-PROBE: NOT DETECTED
CALCIUM UR-MCNC: 9 MG/DL (ref 8.5–10.3)
CHLORIDE SERPL-SCNC: 100 MMOL/L (ref 101–111)
CLARITY UR REFRACT.AUTO: CLEAR
CO2 BLDV-SCNC: 24.1 MMOL/L (ref 24–29)
CO2 SERPL-SCNC: 25 MMOL/L (ref 21–32)
CREAT SERPLBLD-SCNC: 1.8 MG/DL (ref 0.6–1.2)
EOSINOPHIL # BLD MANUAL: 0.2 10^3/UL (ref 0–0.7)
EOSINOPHIL NFR BLD AUTO: 3.1 %
ERYTHROCYTE [DISTWIDTH] IN BLOOD BY AUTOMATED COUNT: 14.7 % (ref 12–15)
EST. AVERAGE GLUCOSE BLD GHB EST-MCNC: 151 MG/DL (ref 70–100)
FLUAV RNA RESP QL NAA+PROBE: NOT DETECTED
GFRSERPLBLD MDRD-ARVRAT: 37 ML/MIN/{1.73_M2} (ref 89–?)
GLOBULIN SER-MCNC: 3.1 G/DL (ref 2.1–4.2)
GLUCOSE SERPL-MCNC: 117 MG/DL (ref 70–100)
GLUCOSE UR QL STRIP.AUTO: NEGATIVE MG/DL
HAEM INFLU B DNA SPEC QL NAA+PROBE: NOT DETECTED
HBA1C MFR BLD HPLC: 6.9 % (ref 4.27–6.07)
HCO3 BLDMV-SCNC: 22.9 MMOL/L (ref 23–28)
HCOV 229E RNA SPEC QL NAA+PROBE: NOT DETECTED
HCOV HKU1 RNA UPPER RESP QL NAA+PROBE: NOT DETECTED
HCOV NL63 RNA ASPIRATE QL NAA+PROBE: NOT DETECTED
HCOV OC43 RNA SPEC QL NAA+PROBE: NOT DETECTED
HCT VFR BLD AUTO: 38.1 % (ref 42–52)
HGB UR QL STRIP: 11.6 G/DL (ref 14–18)
HMPV AG SPEC QL: NOT DETECTED
HPIV1 RNA NPH QL NAA+PROBE: NOT DETECTED
HPIV2 SPEC QL CULT: NOT DETECTED
HPIV3 AB TITR SER CF: NOT DETECTED {TITER}
HPIV4 RNA SPEC QL NAA+PROBE: NOT DETECTED
KETONES UR QL STRIP.AUTO: NEGATIVE MG/DL
LIPASE SERPL-CCNC: 34 U/L (ref 22–51)
LYMPH ABN NFR BLD MANUAL: 0 %
LYMPHOBLASTS # BLD: 13 %
LYMPHOCYTES # BLD MANUAL: 1.5 10^3/UL (ref 1.5–3.5)
LYMPHOCYTES NFR BLD AUTO: 12.2 %
M PNEUMO DNA SPEC QL NAA+PROBE: NOT DETECTED
MANUAL DIF COMMENT BLD-IMP: (no result)
MCH RBC QN AUTO: 29.7 PG (ref 27–31)
MCHC RBC AUTO-ENTMCNC: 30.4 G/DL (ref 32–36)
MCV RBC AUTO: 97.4 FL (ref 80–94)
MONOCYTES # BLD MANUAL: 1 10^3/UL (ref 0–1)
MONOCYTES NFR BLD AUTO: 14.1 %
MYELOCYTES NFR BLD: 2 %
NEUTROPHILS # SNV AUTO: 11.5 X10^3/UL (ref 4.8–10.8)
NEUTROPHILS NFR BLD AUTO: 68.6 %
NEUTROPHILS NFR BLD MANUAL: 8.5 10^3/UL (ref 1.5–6.6)
NEUTS BAND NFR BLD: 1 %
NITRITE UR QL STRIP.AUTO: NEGATIVE
PCO2 BLDV: 37.1 MMHG (ref 41–51)
PDW BLD AUTO: 8.9 FL (ref 7.4–11.4)
PH BLDV: 7.41 [PH] (ref 7.31–7.41)
PH UR STRIP.AUTO: 6.5 PH (ref 5–7.5)
PLAT MORPH BLD: (no result)
PLATELET # BLD: 329 10^3/UL (ref 130–450)
PLATELET BLD QL SMEAR: (no result)
PO2 BLDV: 45.1 MMHG (ref 25–47)
POTASSIUM SERPL-SCNC: 5.2 MMOL/L (ref 3.5–5)
PROT SPEC-MCNC: 7 G/DL (ref 6.7–8.2)
PROT UR STRIP.AUTO-MCNC: NEGATIVE MG/DL
RBC # UR STRIP.AUTO: NEGATIVE /UL
RBC MAR: 3.91 10^6/UL (ref 4.7–6.1)
RBC MORPH BLD: (no result)
RSV RNA RESP QL NAA+PROBE: NOT DETECTED
RV+EV RNA SPEC QL NAA+PROBE: NOT DETECTED
SAO2 DF BLDV: 81.4 % (ref 60–80)
SARS-COV-2 RNA PNL SPEC NAA+PROBE: NOT DETECTED
SODIUM SERPLBLD-SCNC: 136 MMOL/L (ref 135–145)
SP GR UR STRIP.AUTO: 1.01 (ref 1–1.03)
SQUAMOUS URNS QL MICRO: (no result)
UROBILINOGEN UR QL STRIP.AUTO: (no result) E.U./DL
UROBILINOGEN UR STRIP.AUTO-MCNC: (no result) MG/DL
WBC # UR MANUAL: (no result) /HPF (ref 0–3)
WBC MORPH BLD: (no result)

## 2021-10-24 PROCEDURE — 87077 CULTURE AEROBIC IDENTIFY: CPT

## 2021-10-24 PROCEDURE — 99284 EMERGENCY DEPT VISIT MOD MDM: CPT

## 2021-10-24 PROCEDURE — 83036 HEMOGLOBIN GLYCOSYLATED A1C: CPT

## 2021-10-24 PROCEDURE — 96376 TX/PRO/DX INJ SAME DRUG ADON: CPT

## 2021-10-24 PROCEDURE — 82803 BLOOD GASES ANY COMBINATION: CPT

## 2021-10-24 PROCEDURE — 83690 ASSAY OF LIPASE: CPT

## 2021-10-24 PROCEDURE — 99285 EMERGENCY DEPT VISIT HI MDM: CPT

## 2021-10-24 PROCEDURE — 93005 ELECTROCARDIOGRAM TRACING: CPT

## 2021-10-24 PROCEDURE — 83605 ASSAY OF LACTIC ACID: CPT

## 2021-10-24 PROCEDURE — 36415 COLL VENOUS BLD VENIPUNCTURE: CPT

## 2021-10-24 PROCEDURE — 85025 COMPLETE CBC W/AUTO DIFF WBC: CPT

## 2021-10-24 PROCEDURE — 87086 URINE CULTURE/COLONY COUNT: CPT

## 2021-10-24 PROCEDURE — 96365 THER/PROPH/DIAG IV INF INIT: CPT

## 2021-10-24 PROCEDURE — 96366 THER/PROPH/DIAG IV INF ADDON: CPT

## 2021-10-24 PROCEDURE — 71045 X-RAY EXAM CHEST 1 VIEW: CPT

## 2021-10-24 PROCEDURE — 0202U NFCT DS 22 TRGT SARS-COV-2: CPT

## 2021-10-24 PROCEDURE — 84484 ASSAY OF TROPONIN QUANT: CPT

## 2021-10-24 PROCEDURE — 80053 COMPREHEN METABOLIC PANEL: CPT

## 2021-10-24 PROCEDURE — 87070 CULTURE OTHR SPECIMN AEROBIC: CPT

## 2021-10-24 PROCEDURE — 96372 THER/PROPH/DIAG INJ SC/IM: CPT

## 2021-10-24 PROCEDURE — 80048 BASIC METABOLIC PNL TOTAL CA: CPT

## 2021-10-24 PROCEDURE — 87205 SMEAR GRAM STAIN: CPT

## 2021-10-24 PROCEDURE — 93306 TTE W/DOPPLER COMPLETE: CPT

## 2021-10-24 PROCEDURE — 94640 AIRWAY INHALATION TREATMENT: CPT

## 2021-10-24 PROCEDURE — 87040 BLOOD CULTURE FOR BACTERIA: CPT

## 2021-10-24 PROCEDURE — 96375 TX/PRO/DX INJ NEW DRUG ADDON: CPT

## 2021-10-24 PROCEDURE — 87181 SC STD AGAR DILUTION PER AGT: CPT

## 2021-10-24 PROCEDURE — 87631 RESP VIRUS 3-5 TARGETS: CPT

## 2021-10-24 PROCEDURE — 81001 URINALYSIS AUTO W/SCOPE: CPT

## 2021-10-24 RX ADMIN — IPRATROPIUM BROMIDE AND ALBUTEROL SULFATE SCH ML: 2.5; .5 SOLUTION RESPIRATORY (INHALATION) at 11:39

## 2021-10-24 RX ADMIN — METHYLPREDNISOLONE SODIUM SUCCINATE SCH MG: 40 INJECTION, POWDER, FOR SOLUTION INTRAMUSCULAR; INTRAVENOUS at 14:16

## 2021-10-24 RX ADMIN — ACETAMINOPHEN AND CODEINE PHOSPHATE SCH TAB: 300; 30 TABLET ORAL at 14:16

## 2021-10-24 RX ADMIN — HEPARIN SODIUM SCH UNIT: 5000 INJECTION, SOLUTION INTRAVENOUS; SUBCUTANEOUS at 20:57

## 2021-10-24 RX ADMIN — SODIUM CHLORIDE, PRESERVATIVE FREE SCH ML: 5 INJECTION INTRAVENOUS at 16:47

## 2021-10-24 RX ADMIN — ACETAMINOPHEN AND CODEINE PHOSPHATE SCH TAB: 300; 30 TABLET ORAL at 08:27

## 2021-10-24 RX ADMIN — HEPARIN SODIUM SCH UNIT: 5000 INJECTION, SOLUTION INTRAVENOUS; SUBCUTANEOUS at 08:30

## 2021-10-24 RX ADMIN — METHYLPREDNISOLONE SODIUM SUCCINATE SCH MG: 40 INJECTION, POWDER, FOR SOLUTION INTRAMUSCULAR; INTRAVENOUS at 05:51

## 2021-10-24 RX ADMIN — SODIUM CHLORIDE SCH MLS/HR: 9 INJECTION, SOLUTION INTRAVENOUS at 14:16

## 2021-10-24 RX ADMIN — SODIUM CHLORIDE, PRESERVATIVE FREE SCH: 5 INJECTION INTRAVENOUS at 08:45

## 2021-10-24 RX ADMIN — IPRATROPIUM BROMIDE AND ALBUTEROL SULFATE SCH ML: 2.5; .5 SOLUTION RESPIRATORY (INHALATION) at 15:20

## 2021-10-24 RX ADMIN — FORMOTEROL FUMARATE DIHYDRATE SCH MCG: 20 SOLUTION RESPIRATORY (INHALATION) at 19:30

## 2021-10-24 RX ADMIN — FORMOTEROL FUMARATE DIHYDRATE SCH MCG: 20 SOLUTION RESPIRATORY (INHALATION) at 08:27

## 2021-10-24 RX ADMIN — BUDESONIDE SCH MG: 0.5 SUSPENSION RESPIRATORY (INHALATION) at 08:27

## 2021-10-24 RX ADMIN — IPRATROPIUM BROMIDE AND ALBUTEROL SULFATE SCH ML: 2.5; .5 SOLUTION RESPIRATORY (INHALATION) at 08:27

## 2021-10-24 RX ADMIN — INSULIN ASPART SCH UNIT: 100 INJECTION, SOLUTION INTRAVENOUS; SUBCUTANEOUS at 20:55

## 2021-10-24 RX ADMIN — METHYLPREDNISOLONE SODIUM SUCCINATE SCH MG: 40 INJECTION, POWDER, FOR SOLUTION INTRAMUSCULAR; INTRAVENOUS at 21:23

## 2021-10-24 RX ADMIN — INSULIN ASPART SCH UNIT: 100 INJECTION, SOLUTION INTRAVENOUS; SUBCUTANEOUS at 08:36

## 2021-10-24 RX ADMIN — INSULIN ASPART SCH UNIT: 100 INJECTION, SOLUTION INTRAVENOUS; SUBCUTANEOUS at 12:02

## 2021-10-24 RX ADMIN — BUDESONIDE SCH MG: 0.5 SUSPENSION RESPIRATORY (INHALATION) at 19:30

## 2021-10-24 RX ADMIN — INSULIN ASPART SCH UNIT: 100 INJECTION, SOLUTION INTRAVENOUS; SUBCUTANEOUS at 16:47

## 2021-10-24 RX ADMIN — SODIUM CHLORIDE SCH MLS/HR: 9 INJECTION, SOLUTION INTRAVENOUS at 03:50

## 2021-10-24 RX ADMIN — IPRATROPIUM BROMIDE AND ALBUTEROL SULFATE SCH ML: 2.5; .5 SOLUTION RESPIRATORY (INHALATION) at 19:30

## 2021-10-24 RX ADMIN — ACETAMINOPHEN AND CODEINE PHOSPHATE SCH TAB: 300; 30 TABLET ORAL at 20:44
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
## 2021-10-24 NOTE — PHARMACY PROGRESS NOTE
- Best Possible Medication History


Admit Date and Time: 10/24/21 0306


Processed by: Pharmacy


Medication History completed: Yes


Patient Interview: Completed


Secondary Source(s): Physician records, Pharmacy records (PATIENT ABLE TO 

CONFIRM HOME MEDICATIONS )





As the person ultimately responsible for medication therapy, providers are able 

to order a medication from an existing home medication list in [facility] via the 

"Reconcile Routine" prior to Confirmation of that medication by support staff. 

Such practice is discouraged except when the physician, in their clinical 

judgment, deems that a medical need exists for a medication without regard to 

previous use.

## 2021-10-24 NOTE — ED PHYSICIAN DOCUMENTATION
History of Present Illness





- Stated complaint


Stated Complaint: SOA





- Chief complaint


Chief Complaint: Resp





- History obtained from


History obtained from: Patient





- Additonal information


Additional information: 





71yM with pmh copd on home o2 3L p/w progressively worsening shortness of breath

since 10/23 afternoon with productive cough, R sided chest discomfort, increased

oxygen requirement 3L to 4L. patient endorses subjective fever/chills. denies 

n/v/d urinary sx rash back pain, hemoptysis. he has had 2 covid vaccines. note 

that patient did 2 duoneb treatments at home without significant relief then 

called 911. 





Review of Systems


Ten Systems: 10 systems reviewed and negative


Constitutional: reports: Fever, Chills, Myalgias, Fatigue


Cardiac: reports: Chest pain / pressure


Respiratory: reports: Dyspnea, Cough


GI: denies: Nausea, Vomiting


Skin: denies: Rash


Musculoskeletal: denies: Back pain





PD PAST MEDICAL HISTORY





- Past Medical History


Cardiovascular: MI


Respiratory: COPD, Emphysema, Pneumonia, Shortness of breath


Endocrine/Autoimmune: Type 2 diabetes


GI: GERD


GYN: None


: Benign prostate hypertrophy


HEENT: Chronic vision loss


Psych: Depression, Anxiety


Musculoskeletal: Osteoarthritis, Chronic back pain


Derm: None





- Past Surgical History


Past Surgical History: Yes


General: Other (thoracotomy of left lobe with wedge)





- Present Medications


Home Medications: 


                                Ambulatory Orders











 Medication  Instructions  Recorded  Confirmed


 


Acetaminophen/Cod 300/30 [Tylenol 1 - 2 tab PO Q6H PRN 02/02/17 12/31/19





#3]   


 


Albuterol Sulfate [Proair Hfa 2 puffs INH Q4H PRN 02/02/17 12/31/19





Inhaler]   


 


Fluticasone 220 Mcg [Flovent] 2 puffs INH BID 02/02/17 12/31/19


 


Ipratropium/Albuterol [Combivent 1 puffs INH QID 02/02/17 12/31/19





Respimat]   


 


Salmeterol Xinafoate [Serevent 1 puffs INH BID 02/02/17 12/31/19





Diskus]   


 


lisinopriL [Lisinopril] 10 mg PO DAILY 02/02/17 12/31/19


 


metFORMIN [Glucophage] 1,000 mg PO DAILY PM 02/02/17 12/31/19


 


metFORMIN [Glucophage] 1,500 mg PO QDBREAKFAST 02/02/17 12/31/19


 


Benzonatate [Tessalon] 100 mg PO TID #30 capsule 02/06/17 12/31/19


 


diazePAM [Valium] 5 mg PO QPM #30 tablet 02/06/17 12/31/19


 


predniSONE [Deltasone] 20 mg PO DAILYWM #10 tablet 02/06/17 12/31/19


 


Ascorbic Acid [Vitamin C] 500 mg PO DAILY 12/31/19 12/31/19


 


Azithromycin 500 mg PO DAILY 12/31/19 12/31/19


 


Etodolac [Etodolac ER] 500 mg PO DAILY 12/31/19 12/31/19


 


Pioglitazone [Actos] 15 mg PO DAILY 12/31/19 12/31/19


 


Albuterol 2.5 mg INH RTQ4H PRN #50 neb 01/02/20 


 


Budesonide [Pulmicort] 0.5 mg INH RTBID #50 neb 01/02/20 


 


Ipratropium/Albuterol [Duoneb] 3 ml INH RTQ4H PRN #50 neb 01/02/20 














- Allergies


Allergies/Adverse Reactions: 


                                    Allergies











Allergy/AdvReac Type Severity Reaction Status Date / Time


 


ciprofloxacin [From Cipro] Allergy  Unknown Verified 12/30/19 14:21


 


ciprofloxacin HCl * Allergy  Unknown Verified 12/30/19 14:21





[From Cipro]     


 


hydromorphone HCl * Allergy  Unknown Verified 12/30/19 14:21





[From Dilaudid]     


 


oxycodone Allergy  Unknown Verified 12/30/19 14:21


 


oxycodone HCl * Allergy  Unknown Verified 12/30/19 14:21





[From OxyContin]     














- Social History


Does the pt smoke?: No


Smoking Status: Former smoker





- POLST


Patient has POLST: Yes


POLST Status: Full Code





PD ED PE NORMAL





- Vitals


Vital signs reviewed: Yes





- General


General: Alert and oriented X 3, Other (speaking in short sentences, visibly 

tachypneic)





- HEENT


HEENT: Atraumatic, PERRL, EOMI, Pharynx benign, Other





- Neck


Neck: Supple, no meningeal sign





- Cardiac


Cardiac: Other (tachycardic rate, regular rhythm)





- Respiratory


Respiratory: Other (Rhonchi to R upper back on lung auscultation)





- Abdomen


Abdomen: Non tender, Non distended





- Back


Back: No CVA TTP





- Derm


Derm: Normal color, Warm and dry





- Extremities


Extremities: No deformity





- Neuro


Neuro: Alert and oriented X 3





- Psych


Psych: Normal mood, Normal affect





Results





- Vitals


Vitals: 


                               Vital Signs - 24 hr











  10/24/21 10/24/21





  01:50 02:05


 


Temperature 38.6 C H 


 


Heart Rate 130 H 121 H


 


Respiratory 27 H 20





Rate  


 


Blood Pressure 149/91 H 


 


O2 Saturation 95 








                                     Oxygen











O2 Source                      Nasal cannula


 


Oxygen Flow Rate               4

















- Labs


Labs: 


                                Laboratory Tests











  10/24/21 10/24/21 10/24/21





  01:52 02:16 02:16


 


WBC   11.5 H 


 


RBC   3.91 L 


 


Hgb   11.6 L 


 


Hct   38.1 L 


 


MCV   97.4 H 


 


MCH   29.7 


 


MCHC   30.4 L 


 


RDW   14.7 


 


Plt Count   329 


 


MPV   8.9 


 


Neut # (Auto)   Not Reportable 


 


Lymph # (Auto)   Not Reportable 


 


Mono # (Auto)   Not Reportable 


 


Eos # (Auto)   Not Reportable 


 


Baso # (Auto)   Not Reportable 


 


Absolute Nucleated RBC   Not Reportable 


 


Total Counted   100 


 


Band Neuts % (Manual)   1 


 


Abnorm Lymph % (Manual)   0 


 


Myelocytes %   2 H 


 


Nucleated RBC %   Not Reportable 


 


Neutrophils # (Manual)   8.5 H 


 


Lymphocytes # (Manual)   1.5 


 


Monocytes # (Manual)   1.0 


 


Eosinophils # (Manual)   0.2 


 


Basophils # (Manual)   0.0 


 


Differential Comment   MANUAL DIFFERENTIAL 


 


WBC Morphology   NORMAL APPEARANCE 


 


Platelet Estimate   NORMAL (130-450,000) 


 


Platelet Morphology   NORMAL APPEARANCE 


 


RBC Morph Micro Appear   NORMAL APPEARANCE 


 


VBG pH   


 


VBG pCO2   


 


VBG pO2   


 


VBG HCO3   


 


VBG Total CO2   


 


VBG O2 Saturation   


 


VBG Base Excess   


 


Sodium    136


 


Potassium    5.2 H


 


Chloride    100 L


 


Carbon Dioxide    25


 


Anion Gap    11.0


 


BUN    31 H


 


Creatinine    1.8 H


 


Estimated GFR (MDRD)    37 L


 


Glucose    117 H


 


Lactic Acid   


 


Calcium    9.0


 


Total Bilirubin    0.6


 


AST    19


 


ALT    17


 


Alkaline Phosphatase    86


 


Troponin I High Sens  15.7  


 


Total Protein    7.0


 


Albumin    3.9


 


Globulin    3.1


 


Albumin/Globulin Ratio    1.3


 


Lipase    34














  10/24/21 10/24/21





  02:16 02:57


 


WBC  


 


RBC  


 


Hgb  


 


Hct  


 


MCV  


 


MCH  


 


MCHC  


 


RDW  


 


Plt Count  


 


MPV  


 


Neut # (Auto)  


 


Lymph # (Auto)  


 


Mono # (Auto)  


 


Eos # (Auto)  


 


Baso # (Auto)  


 


Absolute Nucleated RBC  


 


Total Counted  


 


Band Neuts % (Manual)  


 


Abnorm Lymph % (Manual)  


 


Myelocytes %  


 


Nucleated RBC %  


 


Neutrophils # (Manual)  


 


Lymphocytes # (Manual)  


 


Monocytes # (Manual)  


 


Eosinophils # (Manual)  


 


Basophils # (Manual)  


 


Differential Comment  


 


WBC Morphology  


 


Platelet Estimate  


 


Platelet Morphology  


 


RBC Morph Micro Appear  


 


VBG pH   7.409


 


VBG pCO2   37.1 L


 


VBG pO2   45.1


 


VBG HCO3   22.9 L


 


VBG Total CO2   24.1


 


VBG O2 Saturation   81.4 H


 


VBG Base Excess   -1.4


 


Sodium  


 


Potassium  


 


Chloride  


 


Carbon Dioxide  


 


Anion Gap  


 


BUN  


 


Creatinine  


 


Estimated GFR (MDRD)  


 


Glucose  


 


Lactic Acid  1.2 


 


Calcium  


 


Total Bilirubin  


 


AST  


 


ALT  


 


Alkaline Phosphatase  


 


Troponin I High Sens  


 


Total Protein  


 


Albumin  


 


Globulin  


 


Albumin/Globulin Ratio  


 


Lipase  














PD MEDICAL DECISION MAKING





- ED course


ED course: 





71yM with pmh copd p/w sepsis 2/2 pneumonia apparent on physical exam with no 

other apparent source. CURB-65 2 (6.8% 30 day mortality) due to age and elevated

BUN. d/w Dr. VELEZ for admission. 





Departure





- Departure


Disposition: ED Place in Observation


Clinical Impression: 


 Pneumonia, Sepsis





Condition: Stable

## 2021-10-24 NOTE — XRAY REPORT
PROCEDURE:  Chest 1 View X-Ray

 

INDICATIONS:  R chest discomfort, fever, cough, soa     COMMENTS: SENT TO [facility].  FEVER TONIGHT AN
D SHORT OF BREATH.  RIGHT CHEST DISCOMFORT. PRIORS: xr chest 1/1/20, xr chest 12/30/19, xr chest 2/2/
17

 

TECHNIQUE:  One view of the chest was acquired.  

 

COMPARISON:  1/1/2020

 

FINDINGS:  

 

Surgical changes and devices:  None.    

  

Lungs and pleura:  No pleural effusions or pneumothorax.    New right suprahilar infiltrate is presen
t with areas of consolidation. Chronic pleural parenchymal changes in the right lateral lung base are
 stable. The left lung is clear.     

 

Mediastinum:  Mediastinal contours appear normal.  Heart size is normal.     

 

Bones and chest wall:  No suspicious bony lesions.  Overlying soft tissues appear unremarkable.     

 

IMPRESSION:  New right upper lobe pneumonia suspected.

 

Findings above correspond with preliminary findings by Charley.

 

Reviewed by: Kyaw Cantrell on 10/24/2021 7:42 AM PDT

Approved by: Kyaw Cantrell on 10/24/2021 7:42 AM PDT

 

 

Station ID:  [id number]

## 2021-10-25 LAB
ANION GAP SERPL CALCULATED.4IONS-SCNC: 11 MMOL/L (ref 6–13)
BASOPHILS NFR BLD AUTO: 0 10^3/UL (ref 0–0.1)
BASOPHILS NFR BLD AUTO: 0.1 %
BUN SERPL-MCNC: 21 MG/DL (ref 6–20)
CALCIUM UR-MCNC: 9.3 MG/DL (ref 8.5–10.3)
CHLORIDE SERPL-SCNC: 103 MMOL/L (ref 101–111)
CO2 SERPL-SCNC: 25 MMOL/L (ref 21–32)
CREAT SERPLBLD-SCNC: 0.9 MG/DL (ref 0.6–1.2)
EOSINOPHIL # BLD AUTO: 0 10^3/UL (ref 0–0.7)
EOSINOPHIL NFR BLD AUTO: 0.1 %
ERYTHROCYTE [DISTWIDTH] IN BLOOD BY AUTOMATED COUNT: 14.4 % (ref 12–15)
GFRSERPLBLD MDRD-ARVRAT: 83 ML/MIN/{1.73_M2} (ref 89–?)
GLUCOSE SERPL-MCNC: 196 MG/DL (ref 70–100)
HCT VFR BLD AUTO: 34.6 % (ref 42–52)
HGB UR QL STRIP: 10.6 G/DL (ref 14–18)
LYMPHOCYTES # SPEC AUTO: 0.4 10^3/UL (ref 1.5–3.5)
LYMPHOCYTES NFR BLD AUTO: 4.8 %
MCH RBC QN AUTO: 29.2 PG (ref 27–31)
MCHC RBC AUTO-ENTMCNC: 30.6 G/DL (ref 32–36)
MCV RBC AUTO: 95.3 FL (ref 80–94)
MONOCYTES # BLD AUTO: 0.6 10^3/UL (ref 0–1)
MONOCYTES NFR BLD AUTO: 7.6 %
NEUTROPHILS # BLD AUTO: 7.1 10^3/UL (ref 1.5–6.6)
NEUTROPHILS # SNV AUTO: 8.3 X10^3/UL (ref 4.8–10.8)
NEUTROPHILS NFR BLD AUTO: 86.4 %
NRBC # BLD AUTO: 0 /100WBC
NRBC # BLD AUTO: 0 X10^3/UL
PDW BLD AUTO: 9.4 FL (ref 7.4–11.4)
PLATELET # BLD: 306 10^3/UL (ref 130–450)
POTASSIUM SERPL-SCNC: 5 MMOL/L (ref 3.5–5)
RBC MAR: 3.63 10^6/UL (ref 4.7–6.1)
SODIUM SERPLBLD-SCNC: 139 MMOL/L (ref 135–145)

## 2021-10-25 RX ADMIN — SODIUM CHLORIDE, PRESERVATIVE FREE PRN ML: 5 INJECTION INTRAVENOUS at 20:38

## 2021-10-25 RX ADMIN — INSULIN ASPART SCH UNIT: 100 INJECTION, SOLUTION INTRAVENOUS; SUBCUTANEOUS at 11:57

## 2021-10-25 RX ADMIN — ACETAMINOPHEN AND CODEINE PHOSPHATE SCH TAB: 300; 30 TABLET ORAL at 01:08

## 2021-10-25 RX ADMIN — SODIUM CHLORIDE, PRESERVATIVE FREE PRN ML: 5 INJECTION INTRAVENOUS at 14:05

## 2021-10-25 RX ADMIN — BUDESONIDE SCH MG: 0.5 SUSPENSION RESPIRATORY (INHALATION) at 17:59

## 2021-10-25 RX ADMIN — BUDESONIDE SCH MG: 0.5 SUSPENSION RESPIRATORY (INHALATION) at 07:27

## 2021-10-25 RX ADMIN — ACETAMINOPHEN AND CODEINE PHOSPHATE SCH TAB: 300; 30 TABLET ORAL at 14:03

## 2021-10-25 RX ADMIN — INSULIN ASPART SCH UNIT: 100 INJECTION, SOLUTION INTRAVENOUS; SUBCUTANEOUS at 08:51

## 2021-10-25 RX ADMIN — IPRATROPIUM BROMIDE AND ALBUTEROL SULFATE SCH ML: 2.5; .5 SOLUTION RESPIRATORY (INHALATION) at 10:40

## 2021-10-25 RX ADMIN — ACETAMINOPHEN AND CODEINE PHOSPHATE SCH TAB: 300; 30 TABLET ORAL at 20:28

## 2021-10-25 RX ADMIN — IPRATROPIUM BROMIDE AND ALBUTEROL SULFATE SCH ML: 2.5; .5 SOLUTION RESPIRATORY (INHALATION) at 17:59

## 2021-10-25 RX ADMIN — SODIUM CHLORIDE, PRESERVATIVE FREE SCH: 5 INJECTION INTRAVENOUS at 01:11

## 2021-10-25 RX ADMIN — INSULIN ASPART SCH UNIT: 100 INJECTION, SOLUTION INTRAVENOUS; SUBCUTANEOUS at 17:25

## 2021-10-25 RX ADMIN — SODIUM CHLORIDE, PRESERVATIVE FREE SCH: 5 INJECTION INTRAVENOUS at 08:48

## 2021-10-25 RX ADMIN — ACETAMINOPHEN AND CODEINE PHOSPHATE SCH TAB: 300; 30 TABLET ORAL at 08:45

## 2021-10-25 RX ADMIN — HEPARIN SODIUM SCH UNIT: 5000 INJECTION, SOLUTION INTRAVENOUS; SUBCUTANEOUS at 20:32

## 2021-10-25 RX ADMIN — FORMOTEROL FUMARATE DIHYDRATE SCH MCG: 20 SOLUTION RESPIRATORY (INHALATION) at 18:00

## 2021-10-25 RX ADMIN — IPRATROPIUM BROMIDE AND ALBUTEROL SULFATE SCH ML: 2.5; .5 SOLUTION RESPIRATORY (INHALATION) at 15:20

## 2021-10-25 RX ADMIN — INSULIN ASPART SCH UNIT: 100 INJECTION, SOLUTION INTRAVENOUS; SUBCUTANEOUS at 20:36

## 2021-10-25 RX ADMIN — METHYLPREDNISOLONE SODIUM SUCCINATE SCH MG: 40 INJECTION, POWDER, FOR SOLUTION INTRAMUSCULAR; INTRAVENOUS at 05:47

## 2021-10-25 RX ADMIN — SODIUM CHLORIDE, PRESERVATIVE FREE SCH ML: 5 INJECTION INTRAVENOUS at 17:25

## 2021-10-25 RX ADMIN — METHYLPREDNISOLONE SODIUM SUCCINATE SCH MG: 40 INJECTION, POWDER, FOR SOLUTION INTRAMUSCULAR; INTRAVENOUS at 14:05

## 2021-10-25 RX ADMIN — IPRATROPIUM BROMIDE AND ALBUTEROL SULFATE SCH ML: 2.5; .5 SOLUTION RESPIRATORY (INHALATION) at 07:27

## 2021-10-25 RX ADMIN — METHYLPREDNISOLONE SODIUM SUCCINATE SCH MG: 40 INJECTION, POWDER, FOR SOLUTION INTRAMUSCULAR; INTRAVENOUS at 20:38

## 2021-10-25 RX ADMIN — FORMOTEROL FUMARATE DIHYDRATE SCH MCG: 20 SOLUTION RESPIRATORY (INHALATION) at 07:27

## 2021-10-25 RX ADMIN — OXYCODONE HYDROCHLORIDE AND ACETAMINOPHEN SCH MG: 500 TABLET ORAL at 08:45

## 2021-10-25 RX ADMIN — HEPARIN SODIUM SCH UNIT: 5000 INJECTION, SOLUTION INTRAVENOUS; SUBCUTANEOUS at 08:51

## 2021-10-26 VITALS — SYSTOLIC BLOOD PRESSURE: 113 MMHG | DIASTOLIC BLOOD PRESSURE: 69 MMHG

## 2021-10-26 LAB
ANION GAP SERPL CALCULATED.4IONS-SCNC: 11 MMOL/L (ref 6–13)
ANION GAP SERPL CALCULATED.4IONS-SCNC: 9 MMOL/L (ref 6–13)
BASOPHILS NFR BLD AUTO: 0 10^3/UL (ref 0–0.1)
BASOPHILS NFR BLD AUTO: 0.2 %
BUN SERPL-MCNC: 27 MG/DL (ref 6–20)
BUN SERPL-MCNC: 29 MG/DL (ref 6–20)
CALCIUM UR-MCNC: 9.5 MG/DL (ref 8.5–10.3)
CALCIUM UR-MCNC: 9.9 MG/DL (ref 8.5–10.3)
CHLORIDE SERPL-SCNC: 102 MMOL/L (ref 101–111)
CHLORIDE SERPL-SCNC: 104 MMOL/L (ref 101–111)
CO2 SERPL-SCNC: 26 MMOL/L (ref 21–32)
CO2 SERPL-SCNC: 27 MMOL/L (ref 21–32)
CREAT SERPLBLD-SCNC: 0.8 MG/DL (ref 0.6–1.2)
CREAT SERPLBLD-SCNC: 0.9 MG/DL (ref 0.6–1.2)
EOSINOPHIL # BLD AUTO: 0 10^3/UL (ref 0–0.7)
EOSINOPHIL NFR BLD AUTO: 0 %
ERYTHROCYTE [DISTWIDTH] IN BLOOD BY AUTOMATED COUNT: 14.3 % (ref 12–15)
GFRSERPLBLD MDRD-ARVRAT: 83 ML/MIN/{1.73_M2} (ref 89–?)
GFRSERPLBLD MDRD-ARVRAT: 95 ML/MIN/{1.73_M2} (ref 89–?)
GLUCOSE SERPL-MCNC: 179 MG/DL (ref 70–100)
GLUCOSE SERPL-MCNC: 206 MG/DL (ref 70–100)
HCT VFR BLD AUTO: 34.3 % (ref 42–52)
HGB UR QL STRIP: 10.6 G/DL (ref 14–18)
LYMPHOCYTES # SPEC AUTO: 0.6 10^3/UL (ref 1.5–3.5)
LYMPHOCYTES NFR BLD AUTO: 5 %
MCH RBC QN AUTO: 29.7 PG (ref 27–31)
MCHC RBC AUTO-ENTMCNC: 30.9 G/DL (ref 32–36)
MCV RBC AUTO: 96.1 FL (ref 80–94)
MONOCYTES # BLD AUTO: 0.7 10^3/UL (ref 0–1)
MONOCYTES NFR BLD AUTO: 6.4 %
NEUTROPHILS # BLD AUTO: 10.1 10^3/UL (ref 1.5–6.6)
NEUTROPHILS # SNV AUTO: 11.5 X10^3/UL (ref 4.8–10.8)
NEUTROPHILS NFR BLD AUTO: 87.4 %
NRBC # BLD AUTO: 0 /100WBC
NRBC # BLD AUTO: 0 X10^3/UL
PDW BLD AUTO: 9 FL (ref 7.4–11.4)
PLATELET # BLD: 330 10^3/UL (ref 130–450)
POTASSIUM SERPL-SCNC: 4.6 MMOL/L (ref 3.5–5)
POTASSIUM SERPL-SCNC: 5.4 MMOL/L (ref 3.5–5)
RBC MAR: 3.57 10^6/UL (ref 4.7–6.1)
SODIUM SERPLBLD-SCNC: 139 MMOL/L (ref 135–145)
SODIUM SERPLBLD-SCNC: 140 MMOL/L (ref 135–145)

## 2021-10-26 RX ADMIN — METHYLPREDNISOLONE SODIUM SUCCINATE SCH MG: 40 INJECTION, POWDER, FOR SOLUTION INTRAMUSCULAR; INTRAVENOUS at 14:02

## 2021-10-26 RX ADMIN — SODIUM CHLORIDE, PRESERVATIVE FREE SCH ML: 5 INJECTION INTRAVENOUS at 08:59

## 2021-10-26 RX ADMIN — BUDESONIDE SCH MG: 0.5 SUSPENSION RESPIRATORY (INHALATION) at 08:50

## 2021-10-26 RX ADMIN — FORMOTEROL FUMARATE DIHYDRATE SCH MCG: 20 SOLUTION RESPIRATORY (INHALATION) at 08:50

## 2021-10-26 RX ADMIN — HEPARIN SODIUM SCH UNIT: 5000 INJECTION, SOLUTION INTRAVENOUS; SUBCUTANEOUS at 08:57

## 2021-10-26 RX ADMIN — OXYCODONE HYDROCHLORIDE AND ACETAMINOPHEN SCH MG: 500 TABLET ORAL at 08:32

## 2021-10-26 RX ADMIN — ACETAMINOPHEN AND CODEINE PHOSPHATE SCH TAB: 300; 30 TABLET ORAL at 02:04

## 2021-10-26 RX ADMIN — SODIUM CHLORIDE, PRESERVATIVE FREE SCH ML: 5 INJECTION INTRAVENOUS at 00:10

## 2021-10-26 RX ADMIN — IPRATROPIUM BROMIDE AND ALBUTEROL SULFATE SCH ML: 2.5; .5 SOLUTION RESPIRATORY (INHALATION) at 08:50

## 2021-10-26 RX ADMIN — IPRATROPIUM BROMIDE AND ALBUTEROL SULFATE SCH ML: 2.5; .5 SOLUTION RESPIRATORY (INHALATION) at 14:36

## 2021-10-26 RX ADMIN — ACETAMINOPHEN AND CODEINE PHOSPHATE SCH TAB: 300; 30 TABLET ORAL at 08:04

## 2021-10-26 RX ADMIN — METHYLPREDNISOLONE SODIUM SUCCINATE SCH MG: 40 INJECTION, POWDER, FOR SOLUTION INTRAMUSCULAR; INTRAVENOUS at 05:45

## 2021-10-26 RX ADMIN — INSULIN ASPART SCH UNIT: 100 INJECTION, SOLUTION INTRAVENOUS; SUBCUTANEOUS at 11:54

## 2021-10-26 RX ADMIN — ACETAMINOPHEN AND CODEINE PHOSPHATE SCH TAB: 300; 30 TABLET ORAL at 14:01

## 2021-10-26 RX ADMIN — INSULIN ASPART SCH UNIT: 100 INJECTION, SOLUTION INTRAVENOUS; SUBCUTANEOUS at 08:15

## 2021-10-26 NOTE — DISCHARGE PLAN
Discharge Plan


Problem Reviewed?: Yes


Disposition: 01 Home, Self Care


Condition: Stable


Prescriptions: 


levoFLOXacin [Levaquin] 750 mg PO DAILY 5 Days #15 tablet


Diet: Diabetic (Low sodium)


Activity Restrictions: Activity as Tolerated


Health Concerns: 


You were admitted on 10/24/2021 with shortness of breath and coughing.  Work-up 

showed that you had community-acquired pneumonia.


You were admitted to the hospital and placed on more supplemental oxygen.  You 

were also treated with Levaquin 750 mg IV daily for 2 days.


You were also given breathing treatments which included DuoNeb, albuterol and 

Xopenex. You were also treated with Perforomist and budesonide.  Over the course

 of your 2-day hospital stay your respiratory status improved significantly. 


You will be discharged home with a prescription of Levaquin 750 mg p.o. daily 

for 5 more days.


It was also noted during your hospital stay that you had a heart murmur and 

significant lower extremity edema.


Work-up included a 2D echocardiogram which showed that you had moderate aortic 

stenosis. Your ejection fraction was normal at 65 to 70%.


You have been advised to follow-up with your primary care physician for close 

monitoring of the moderate aortic stenosis and possible repeat of a 2D 

echocardiogram in 1 year.


You declined taking any diuretic to treat your significant lower extremity 

edema.


You are being discharged in stable condition.  You are back to your baseline 3 L

 of oxygen at rest.


You expressed understanding to the above and are agreeable to the plan.


No Smoking: If you smoke, Please STOP!  Call [phone] for help.


Follow-up with: 


BRENDAN SEBASTIAN MD [Primary Care Provider] -

## 2021-10-26 NOTE — DISCHARGE SUMMARY
Discharge Summary


Admit Date: 10/24/21


Discharge Date: 10/26/21


Discharging Provider: Maria Teresa Barillas


Primary Care Provider: Anjel Westbrook


Code Status: Attempt Resuscitation


Condition at Discharge: Stable


Discharge Disposition: 01 Home, Self Care





- DIAGNOSES


Admission Diagnoses: 





Acute on chronic respiratory failure with hypoxia


Sepsis


Community-acquired pneumonia


Pleuritic chest pain


COPD with chronic bronchitis


JOSE LUIS


Diabetes mellitus type II, non insulin dependent


Hyperkalemia


Discharge Diagnoses with Status of Each Condition: 





Acute on chronic respiratory failure with hypoxia: Improved. 2/2 Pneumonia and 

COPD. Prescribed Levaquin 750mg po daily X5 days upon discharge


Sepsis: Acute. Improved. 2/2 Pneumonia. Improved. Prescribed Levaquin 750mg po 

daily X5 days upon discharge


Community-acquired pneumonia: Improved. 2/2 Pneumonia. Improved. Prescribed 

Levaquin 750mg po daily X5 days upon discharge


Pleuritic chest pain: Acute Improved


COPD with chronic bronchitis: Improved


JOSE LUIS: Resolved. Cr 0.9 eGFR 83


Diabetes mellitus type II, non insulin dependent: Stable. Continue home 

medication


Hyperkalemia. Acute. Resolved. Potassium 4.6


Moderate Aortic Stenosis: New Diagnosis. To follow up with PCP





- HPI


History of Present Illness: 





Per HPI:


This is a 71-year-old white male with a history of end-stage COPD, on home 

oxygen (at 3 L at rest and 4-5 L with activity), also history of anxiety and 

Diabetes not on Insulin.  He was admitted here 1-1/2 years ago for COPD 

exacerbation, at that time had a Palliative Care consult with Alena Rolle NP 

due to his end-stage COPD.  He voiced wanting to be a DNR/DNI.  The patient 

lives alone in an apartment, uses [employer], has a portable oxygen tank.  He 

has a Pulmonologist at [facility] that he sees every 6 months.  


One week ago the patient started to develop progressively worsening shortness of

breath with activity, and an increasing cough with increased sputum production, 

more than his usual, that was beige in color. He had intermittent pleuritic 

chest pain in the right lateral chest region and he suspected he had a pne

umonia.  He did not seek medical attention, however until today, when he spiked 

a fever at home of 102.7 F, and then he called an ambulance and was brought to 

the ED.  Upon presentation, his heart rate was 130 in sinus tachycardia, he had 

a fever of 38.6C, was tachypneic with resp. rate 27 but saturations were 93-97% 

on his usual oxygen settings.  A CXR showed a R sided pneumonia and WBC was 

elevated at 11.5.  He is Covid neg and had 2 shots for a Covid vaccination.  His

heart rate improved with IV fluids and as the fever decreased.  He was presented

to the Hospitalist service for management of a community-acquired pneumonia.  





- HOSPITAL COURSE


Hospital Course: 





Patient was admitted to the hospital and started on Levaquin 750 mg IV daily. He

received this for 2 days of his hospital stay.


He was prescribed DuoNeb, albuterol, Xopenex, budesonide and Perforomist during 

the course of his hospital stay. His breathing/respiration improved significantl

y over the course of this 2 days. By the day of discharge he was back to his 

baseline oxygen requirement of 3 L.


He was discharged home with a prescription of Levaquin 750 mg p.o. daily for 5 

more days.


Due to significant lower extremity edema he had a 2D echocardiogram done on 

10/25/2021 which showed an ejection fraction of 65 to 70%. It showed 

indeterminate left ventricular filling pattern due to tachycardia. There was no 

obvious wall motion abnormality. The right atrium was moderately enlarged. He 

was noted to have a moderate aortic stenosis with peak/mean pressure gradient of

77 mmHg over 55 mmHg. The aortic valve is bicuspid.


He was advised to follow-up with his primary care physician. For closer 

monitoring of this moderate aortic stenosis. He may need repeat 2D 

echocardiogram in about 1 year. Patient would subsequently need replacement of 

his aortic valve when the stenosis becomes severe.


He was discharged home in stable condition after 2 days of hospital stay.





- ALLERGIES


Allergies/Adverse Reactions: 


                                    Allergies











Allergy/AdvReac Type Severity Reaction Status Date / Time


 


ciprofloxacin [From Cipro] Allergy  Unknown Verified 12/30/19 14:21


 


ciprofloxacin HCl * Allergy  Unknown Verified 12/30/19 14:21





[From Cipro]     


 


hydromorphone HCl * Allergy  Unknown Verified 12/30/19 14:21





[From Dilaudid]     


 


oxycodone Allergy  Unknown Verified 12/30/19 14:21


 


oxycodone HCl * Allergy  Unknown Verified 12/30/19 14:21





[From OxyContin]     














- MEDICATIONS


Home Medications: 


                                Ambulatory Orders











 Medication  Instructions  Recorded  Confirmed


 


Acetaminophen/Cod 300/30 [Tylenol 1 - 2 tab PO Q6H PRN 02/02/17 10/24/21





#3]   


 


Albuterol Sulfate [Proair Hfa 2 puffs INH Q4H PRN 02/02/17 10/24/21





Inhaler]   


 


Fluticasone 220 Mcg [Flovent] 2 puffs INH BID 02/02/17 10/24/21


 


Ipratropium/Albuterol [Combivent 1 puffs INH QID 02/02/17 10/24/21





Respimat]   


 


Salmeterol Xinafoate [Serevent 1 puffs INH BID 02/02/17 10/24/21





Diskus]   


 


lisinopriL [Lisinopril] 5 mg PO DAILY 02/02/17 10/24/21


 


metFORMIN [Glucophage] 1,000 mg PO DAILY PM 02/02/17 10/24/21


 


metFORMIN [Glucophage] 1,500 mg PO QDBREAKFAST 02/02/17 10/24/21


 


diazePAM [Valium] 5 mg PO QPM #30 tablet 02/06/17 10/24/21


 


Ascorbic Acid [Vitamin C] 500 mg PO DAILY 12/31/19 10/24/21


 


Pioglitazone [Actos] 15 mg PO DAILY 12/31/19 10/24/21


 


Etodolac [Etodolac ER] 400 mg PO DAILY 10/24/21 10/24/21


 


Ipratropium/Albuterol [Duoneb] 3 ml INH TID PRN 10/24/21 10/24/21


 


predniSONE [Deltasone] 20 mg PO DAILY 10/24/21 10/24/21


 


levoFLOXacin [Levaquin] 750 mg PO DAILY 5 Days #15 tablet 10/26/21 














- PHYSICAL EXAM AT DISCHARGE


General Appearance: positive: No acute distress, Alert


Eyes Bilateral: positive: PERRL, EOMI


ENT: positive: No signs of dehydration


Neck: positive: Trachea midline


Respiratory: positive: Chest non-tender, No respiratory distress, Other (Coarse 

breath sounds with mild wheeze)


Cardiovascular: positive: Regular rate & rhythm, Systolic murmur


Abdomen: positive: Non-tender, No organomegaly, Nml bowel sounds, No distention.

  negative: Guarding, Rebound


Back: positive: Nml inspection


Skin: positive: Color nml, No rash, Warm, Dry


Extremities: positive: Non-tender, Full ROM, Pedal edema (3+ lower extremity 

edema)


Neurologic/Psychiatric: positive: Oriented x3, Mood/affect nml





- LABS


Result Diagrams: 


                                 10/26/21 05:46





                                 10/26/21 11:23





- SEPSIS


Possible source of Sepsis: Pulmonary


Sepsis Criteria: Recorded Heart Rate greater than 90 bpm, Recorded Respiratory 

Rate greater than 20, Respiratory: Increasing oxygen requirements





- TIME SPENT


Time Spent in Discharge (Minutes): 25

## 2022-08-09 NOTE — PROVIDER PROGRESS NOTE
Assessment/Plan





- Problem List


(1) Acute respiratory failure with hypoxia


Assessment/Plan: 


Likely secondary to pneumonia, COPD, and moderate aortic stenosis.


Patient receiving Levaquin 750 mg IV daily.


On supplemental oxygen.


Receiving DuoNeb, albuterol and Xopenex scheduled and or as needed.


Patient reports improvement in his breathing today compared to yesterday.








(2) CAP (community acquired pneumonia)


Qualifiers: 


   Laterality: right   Lung location: upper lobe of lung   Qualified Code(s): 

J18.9 - Pneumonia, unspecified organism   


Assessment/Plan: 


On Levaquin 750 mg IV daily.








(3) COPD (chronic obstructive pulmonary disease) with chronic bronchitis


Assessment/Plan: 


Patient reports improvement in his respiratory status today compared to the 

previous day.On budesonide 0.5 mg twice daily.


Formoterol 20 mcg inhaled twice daily.


Albuterol On DuoNeb


On 3 L of oxygen via nasal cannula.


On Levaquin 750 mg IV daily.








(4) Pleuritic chest pain


Assessment/Plan: 


Pain management with Tylenol as needed.








(5) Moderate aortic stenosis


Assessment/Plan: 


2D echocardiogram done 10/25/2021 showed an ejection fraction of 65 to 70%.  

There was no obvious wall motion abnormality.


It showed moderate aortic stenosis with peak mean pressure gradient of 77 mmHg /

55 mmHg


Mildly abnormal right heart pressure with an RVSP at rest of 43 mmHg.


There was no pleural effusion noted.  There was no pericardial effusion noted.











(6) JOSE LUIS (acute kidney injury)


Assessment/Plan: 


Improved/resolved.


Patient's creatinine today was 0.9 with an estimated GFR of 83.


We will continue to monitor.








(7) Diabetes mellitus type II, non insulin dependent


Assessment/Plan: 


Diet ordered.


Sliding scale insulin.  Accu-Cheks before every meal and at bedtime.


Will resume patient's Actos and Metformin upon discharge.








(8) Hyperkalemia


Assessment/Plan: 


Improved/resolved.


Potassium was 5.0 today.








- Current Meds


Current Meds: 





                               Current Medications











Generic Name Dose Route Start Last Admin





  Trade Name Freq  PRN Reason Stop Dose Admin


 


Acetaminophen/Codeine Phosphate  1 tab  10/24/21 08:00  10/25/21 08:45





  Acetaminophen/Codeine 300 Mg/30 Mg Tablet  PO   1 tab





  Q6H DOMINIQUE   Administration


 


Albuterol  2.5 mg  10/24/21 12:33  10/25/21 00:05





  Albuterol Neb 2.5 Mg/3 Ml  INH   2.5 mg





  RTQ4H PRN   Administration





  Wheezing  


 


Albuterol/Ipratropium  3 ml  10/24/21 08:10  10/25/21 07:27





  Ipratropium/Albuterol 3 Ml Neb  INH   3 ml





  RTQID DOMINIQUE   Administration


 


Ascorbic Acid  500 mg  10/25/21 09:00  10/25/21 08:45





  Ascorbic Acid 500 Mg Tablet  PO   500 mg





  DAILY DOMINIQUE   Administration


 


Budesonide  0.5 mg  10/24/21 08:10  10/25/21 07:27





  Budesonide 0.5 Mg/2 Ml Neb  INH   0.5 mg





  RTBID DOMINIQUE   Administration


 


Diazepam  5 mg  10/24/21 03:32  10/24/21 20:44





  Diazepam 5 Mg Tablet  PO   5 mg





  QPM DOMINIQUE   Administration


 


Formoterol Fumarate  20 mcg  10/24/21 08:10  10/25/21 07:27





  Formoterol Fumarate Neb 20 Mcg/2 Ml  INH   20 mcg





  RTBID DOMINIQUE   Administration


 


Guaifenesin  600 mg  10/24/21 09:00  10/25/21 08:45





  Guaifenesin 600 Mg Tablet  PO   600 mg





  BID DOMINIQUE   Administration


 


Heparin Sodium (Porcine)  5,000 unit  10/24/21 09:00  10/25/21 08:51





  Heparin 5,000 Unit/Ml Vial  SUBQ   5,000 unit





  BID DOMINIQUE   Administration


 


Levofloxacin  500 mg in 100 mls @ 66.667 mls/hr  10/25/21 09:00  10/25/21 08:48





  Levaquin 500 Mg/100 Ml  IV   66.667 mls/hr





  DAILY DOMINIQUE   Administration


 


Insulin Aspart  1 - 9 unit  10/24/21 17:00  10/25/21 08:51





  Insulin Aspart 300 Unit/3 Ml Pen  SUBQ   1 unit





  0800,1200,1700,2100 DOMINIQUE   Administration





  Protocol  


 


Lisinopril  5 mg  10/25/21 09:00  10/25/21 08:44





  Lisinopril 5 Mg Tablet  PO   5 mg





  DAILY DOMINIQUE   Administration


 


Methylprednisolone  40 mg  10/24/21 06:00  10/25/21 05:47





  Methylprednisolone Succinate 40 Mg/Ml Vial  IVP   40 mg





  TID DOMINIQUE   Administration


 


Sodium Chloride  10 ml  10/24/21 09:00  10/25/21 08:48





  Sodium Chloride Flush 0.9% 10 Ml Syringe  IVP   Not Given





  0100,0900,1700 DOMINIQUE  














- Lab Result


Fish Bone Diagrams: 


                                 10/25/21 05:50





                                 10/25/21 05:50





- Additional Planning


My Orders: 





My Active Orders





10/24/21 07:56


Nebulizer/MDI Tx. [RC] .qid 





10/24/21 08:00


Acetaminophen/Cod 300/30 [Tylenol #3]   1 tab PO Q6H 





10/24/21 08:10


Formoterol Fumarate [Perforomist]   20 mcg INH RTBID 


Ipratropium/Albuterol [Duoneb]   3 ml INH RTQID 





10/24/21 12:33


Albuterol   2.5 mg INH RTQ4H PRN 





10/24/21 12:40


SCDs [RC] QSHIFT 





10/25/21 08:00


Echo Transthoracic Complete [ECHO] Routine 





10/25/21 09:00


levoFLOXacin 500 MG/100 ML [Levaquin 500 mg/100 ml] 500 mg in 100 ml IV DAILY 


lisinopriL [Zestril]   5 mg PO DAILY 














Subjective





- Subjective


Patient Reports: Other (Patient was resting in bed comfortably at time of exam. 

 He reports breathing better today than the previous day.  On auscultation there

 is improved air movement on the lungs.  Lower extremity edema still noted at 

3+.)





Objective


Vital Signs: 





                               Vital Signs - 24 hr











  10/24/21 10/24/21 10/24/21





  11:40 12:55 15:20


 


Temperature  36.9 C 


 


Heart Rate 87  87


 


Heart Rate [  93 





Brachial]   


 


Respiratory 18 22 22





Rate   


 


Blood Pressure  136/71 H 





[Left Brachial   





artery]   


 


Blood Pressure   





[Right Brachial   





artery]   


 


O2 Saturation  98 














  10/24/21 10/24/21 10/24/21





  16:26 19:30 19:52


 


Temperature 36.9 C  36.8 C


 


Heart Rate  86 


 


Heart Rate [ 85  89





Brachial]   


 


Respiratory 22 20 22





Rate   


 


Blood Pressure 104/58 L  114/61





[Left Brachial   





artery]   


 


Blood Pressure   





[Right Brachial   





artery]   


 


O2 Saturation 98  100














  10/24/21 10/25/21 10/25/21





  23:53 00:05 05:00


 


Temperature 36.6 C  36.4 C L


 


Heart Rate  76 


 


Heart Rate [ 81  83





Brachial]   


 


Respiratory 20 20 16





Rate   


 


Blood Pressure   





[Left Brachial   





artery]   


 


Blood Pressure 127/56 L  132/63 H





[Right Brachial   





artery]   


 


O2 Saturation 98  97














  10/25/21 10/25/21





  07:27 07:51


 


Temperature  36.5 C


 


Heart Rate 75 


 


Heart Rate [  79





Brachial]  


 


Respiratory 18 18





Rate  


 


Blood Pressure  





[Left Brachial  





artery]  


 


Blood Pressure  111/91 H





[Right Brachial  





artery]  


 


O2 Saturation  95








                                     Oxygen











O2 Source                      Nasal cannula


 


Oxygen Flow Rate               4














I&O (Last 24 Hrs): 





                          Intake and Output Totals x24h











 10/23/21 10/24/21 10/25/21





 23:59 23:59 23:59


 


Intake Total  4625 


 


Output Total  4150 1450


 


Balance  475 -1450











General: Alert, Oriented x3, Mild distress


HEENT: PERRLA, EOMI


Neck: Other (JVD present)


Neuro: Alert, Oriented Times 3


Cardiovascular: Regular rate, Normal S1, Normal S2


Respiratory: Chest non-tender, Wheezes (bilaterally.), Other (Improved lung 

sounds)


Abdomen: Normal bowel sounds, Soft, No tenderness, No masses


Extremities: Other (3+ edema bilaterally)





- Results


Results: 





                               Laboratory Results











WBC  8.3 x10^3/uL (4.8-10.8)   10/25/21  05:50    


 


RBC  3.63 10^6/uL (4.70-6.10)  L  10/25/21  05:50    


 


Hgb  10.6 g/dL (14.0-18.0)  L  10/25/21  05:50    


 


Hct  34.6 % (42.0-52.0)  L  10/25/21  05:50    


 


MCV  95.3 fL (80.0-94.0)  H  10/25/21  05:50    


 


MCH  29.2 pg (27.0-31.0)   10/25/21  05:50    


 


MCHC  30.6 g/dL (32.0-36.0)  L  10/25/21  05:50    


 


RDW  14.4 % (12.0-15.0)   10/25/21  05:50    


 


Plt Count  306 10^3/uL (130-450)   10/25/21  05:50    


 


MPV  9.4 fL (7.4-11.4)   10/25/21  05:50    


 


Neut # (Auto)  7.1 10^3/uL (1.5-6.6)  H  10/25/21  05:50    


 


Lymph # (Auto)  0.4 10^3/uL (1.5-3.5)  L  10/25/21  05:50    


 


Mono # (Auto)  0.6 10^3/uL (0.0-1.0)   10/25/21  05:50    


 


Eos # (Auto)  0.0 10^3/uL (0.0-0.7)   10/25/21  05:50    


 


Baso # (Auto)  0.0 10^3/uL (0.0-0.1)   10/25/21  05:50    


 


Absolute Nucleated RBC  0.00 x10^3/uL  10/25/21  05:50    


 


Total Counted  100   10/24/21  02:16    


 


Band Neuts % (Manual)  1 % (0-10)  10/24/21  02:16    


 


Abnorm Lymph % (Manual)  0 %  10/24/21  02:16    


 


Myelocytes %  2 % (-0) H  10/24/21  02:16    


 


Nucleated RBC %  0.0 /100WBC  10/25/21  05:50    


 


Neutrophils # (Manual)  8.5 10^3/uL (1.5-6.6)  H  10/24/21  02:16    


 


Lymphocytes # (Manual)  1.5 10^3/uL (1.5-3.5)   10/24/21  02:16    


 


Monocytes # (Manual)  1.0 10^3/uL (0.0-1.0)   10/24/21  02:16    


 


Eosinophils # (Manual)  0.2 10^3/uL (0-0.7)   10/24/21  02:16    


 


Basophils # (Manual)  0.0 10^3/uL (0-0.1)   10/24/21  02:16    


 


Differential Comment  MANUAL DIFFERENTIAL   10/24/21  02:16    


 


WBC Morphology  NORMAL APPEARANCE  (NORMAL)   10/24/21  02:16    


 


Platelet Estimate  NORMAL (130-450,000)  (NORMAL)   10/24/21  02:16    


 


Platelet Morphology  NORMAL APPEARANCE  (NORMAL)   10/24/21  02:16    


 


RBC Morph Micro Appear  NORMAL APPEARANCE  (NORMAL)   10/24/21  02:16    


 


VBG pH  7.409  (7.31-7.41)   10/24/21  02:57    


 


VBG pCO2  37.1 mmHg (41-51)  L  10/24/21  02:57    


 


VBG pO2  45.1 mmHg (25-47)   10/24/21  02:57    


 


VBG HCO3  22.9 mmol/L (23-28)  L  10/24/21  02:57    


 


VBG Total CO2  24.1 mmol/L (24-29)   10/24/21  02:57    


 


VBG O2 Saturation  81.4 % (60-80)  H  10/24/21  02:57    


 


VBG Base Excess  -1.4 mmol/L (-2 - +2)   10/24/21  02:57    


 


Sodium  139 mmol/L (135-145)   10/25/21  05:50    


 


Potassium  5.0 mmol/L (3.5-5.0)   10/25/21  05:50    


 


Chloride  103 mmol/L (101-111)   10/25/21  05:50    


 


Carbon Dioxide  25 mmol/L (21-32)   10/25/21  05:50    


 


Anion Gap  11.0  (6-13)   10/25/21  05:50    


 


BUN  21 mg/dL (6-20)  H  10/25/21  05:50    


 


Creatinine  0.9 mg/dL (0.6-1.2)   10/25/21  05:50    


 


Estimated GFR (MDRD)  83  (>89)  L  10/25/21  05:50    


 


Glucose  196 mg/dL ()  H  10/25/21  05:50    


 


Estimat Average Glucose  151 mg/dL ()  H  10/24/21  01:53    


 


Hemoglobin A1c %  6.9 % (4.27-6.07)  H  10/24/21  01:53    


 


Lactic Acid  1.2 mmol/L (0.5-2.2)   10/24/21  02:16    


 


Calcium  9.3 mg/dL (8.5-10.3)   10/25/21  05:50    


 


Total Bilirubin  0.6 mg/dL (0.2-1.0)   10/24/21  02:16    


 


AST  19 IU/L (10-42)   10/24/21  02:16    


 


ALT  17 IU/L (10-60)   10/24/21  02:16    


 


Alkaline Phosphatase  86 IU/L ()   10/24/21  02:16    


 


Troponin I High Sens  15.7 ng/L (2.3-19.7)   10/24/21  01:52    


 


Total Protein  7.0 g/dL (6.7-8.2)   10/24/21  02:16    


 


Albumin  3.9 g/dL (3.2-5.5)   10/24/21  02:16    


 


Globulin  3.1 g/dL (2.1-4.2)   10/24/21  02:16    


 


Albumin/Globulin Ratio  1.3  (1.0-2.2)   10/24/21  02:16    


 


Lipase  34 U/L (22-51)   10/24/21  02:16    


 


Urine Color  YELLOW   10/24/21  08:17    


 


Urine Clarity  CLEAR  (CLEAR)   10/24/21  08:17    


 


Urine pH  6.5 PH (5.0-7.5)   10/24/21  08:17    


 


Ur Specific Gravity  1.015  (1.002-1.030)   10/24/21  08:17    


 


Urine Protein  NEGATIVE mg/dL (NEGATIVE)   10/24/21  08:17    


 


Urine Glucose (UA)  NEGATIVE mg/dL (NEGATIVE)   10/24/21  08:17    


 


Urine Ketones  NEGATIVE mg/dL (NEGATIVE)   10/24/21  08:17    


 


Urine Occult Blood  NEGATIVE  (NEGATIVE)   10/24/21  08:17    


 


Urine Nitrite  NEGATIVE  (NEGATIVE)   10/24/21  08:17    


 


Urine Bilirubin  MODERATE  (NEGATIVE)  H  10/24/21  08:17    


 


Urine Urobilinogen  0.2 (NORMAL) E.U./dL (NORMAL)   10/24/21  08:17    


 


Ur Leukocyte Esterase  NEGATIVE  (NEGATIVE)   10/24/21  08:17    


 


Urine RBC  None Seen /HPF (0-5)   10/24/21  08:17    


 


Urine WBC  0-3 /HPF (0-3)   10/24/21  08:17    


 


Ur Squamous Epith Cells  NONE SEEN  (<= Few)   10/24/21  08:17    


 


Urine Bacteria  None Seen /HPF (None Seen)   10/24/21  08:17    


 


Urine Culture Comments  NOT INDICATED   10/24/21  08:17    


 


Nasal Adenovirus (PCR)  NOT DETECTED   10/24/21  02:38    


 


Nasal B. parapertussis DNA (PCR)  NOT DETECTED   10/24/21  02:38    


 


Nasal Coronavir 229E PCR  NOT DETECTED   10/24/21  02:38    


 


Nasal Coronavir HKU1 PCR  NOT DETECTED   10/24/21  02:38    


 


Nasal Coronavir NL63 PCR  NOT DETECTED   10/24/21  02:38    


 


Nasal Coronavir OC43 PCR  NOT DETECTED   10/24/21  02:38    


 


Nasal Enterovir/Rhinovir PCR  NOT DETECTED   10/24/21  02:38    


 


Nasal Influenza B PCR  NOT DETECTED   10/24/21  02:38    


 


Nasal Influenza A PCR  NOT DETECTED   10/24/21  02:38    


 


Nasal Parainfluen 1 PCR  NOT DETECTED   10/24/21  02:38    


 


Nasal Parainfluen 2 PCR  NOT DETECTED   10/24/21  02:38    


 


Nasal Parainfluen 3 PCR  NOT DETECTED   10/24/21  02:38    


 


Nasal Parainfluen 4 PCR  NOT DETECTED   10/24/21  02:38    


 


Nasal RSV (PCR)  NOT DETECTED   10/24/21  02:38    


 


Nasal B.pertussis DNA PCR  NOT DETECTED   10/24/21  02:38    


 


Nasal C.pneumoniae (PCR)  NOT DETECTED   10/24/21  02:38    


 


Reji Human Metapneumo PCR  NOT DETECTED   10/24/21  02:38    


 


Nasal M.pneumoniae (PCR)  NOT DETECTED   10/24/21  02:38    


 


Nasal SARS-CoV-2 (PCR)  NOT DETECTED   10/24/21  02:38    














Sepsis Event Note (H)





- Evaluation


Possible source of Sepsis: positive: Pulmonary





- Sepsis Criteria


Sepsis Criteria: Recorded Heart Rate greater than 90 bpm, Recorded Respiratory 

Rate greater than 20, Respiratory: Increasing oxygen requirements





ABX Reporting


Has patient been on IV antibiotics over the past 48 hours?: Yes
PAST SURGICAL HISTORY:  History of appendectomy     History of cataract surgery b/l    History of colon resection for intenstinal obstruction 2004    S/P ear surgery several    Status post open reduction and internal fixation (ORIF) of fracture right wrist

## 2022-10-27 ENCOUNTER — HOSPITAL ENCOUNTER (OUTPATIENT)
Dept: HOSPITAL 76 - EMS | Age: 72
End: 2022-10-27
Payer: MEDICARE